# Patient Record
Sex: FEMALE | Race: BLACK OR AFRICAN AMERICAN | NOT HISPANIC OR LATINO
[De-identification: names, ages, dates, MRNs, and addresses within clinical notes are randomized per-mention and may not be internally consistent; named-entity substitution may affect disease eponyms.]

---

## 2017-08-18 ENCOUNTER — RESULT REVIEW (OUTPATIENT)
Age: 72
End: 2017-08-18

## 2019-09-23 ENCOUNTER — EMERGENCY (EMERGENCY)
Facility: HOSPITAL | Age: 74
LOS: 1 days | Discharge: HOME | End: 2019-09-23
Attending: EMERGENCY MEDICINE | Admitting: EMERGENCY MEDICINE
Payer: MEDICARE

## 2019-09-23 VITALS
OXYGEN SATURATION: 94 % | RESPIRATION RATE: 28 BRPM | DIASTOLIC BLOOD PRESSURE: 82 MMHG | SYSTOLIC BLOOD PRESSURE: 130 MMHG | HEART RATE: 90 BPM

## 2019-09-23 DIAGNOSIS — Z90.49 ACQUIRED ABSENCE OF OTHER SPECIFIED PARTS OF DIGESTIVE TRACT: Chronic | ICD-10-CM

## 2019-09-23 DIAGNOSIS — Z98.89 OTHER SPECIFIED POSTPROCEDURAL STATES: Chronic | ICD-10-CM

## 2019-09-23 DIAGNOSIS — Z90.710 ACQUIRED ABSENCE OF BOTH CERVIX AND UTERUS: Chronic | ICD-10-CM

## 2019-09-23 LAB
ALBUMIN SERPL ELPH-MCNC: 3.7 G/DL — SIGNIFICANT CHANGE UP (ref 3.3–5)
ALP SERPL-CCNC: 98 U/L — SIGNIFICANT CHANGE UP (ref 40–120)
ALT FLD-CCNC: 92 U/L — HIGH (ref 4–33)
ANION GAP SERPL CALC-SCNC: 16 MMO/L — HIGH (ref 7–14)
APTT BLD: 28.5 SEC — SIGNIFICANT CHANGE UP (ref 27.5–36.3)
AST SERPL-CCNC: 111 U/L — HIGH (ref 4–32)
BASE EXCESS BLDV CALC-SCNC: 1.8 MMOL/L — SIGNIFICANT CHANGE UP
BASOPHILS # BLD AUTO: 0.14 K/UL — SIGNIFICANT CHANGE UP (ref 0–0.2)
BASOPHILS NFR BLD AUTO: 0.8 % — SIGNIFICANT CHANGE UP (ref 0–2)
BILIRUB SERPL-MCNC: 0.6 MG/DL — SIGNIFICANT CHANGE UP (ref 0.2–1.2)
BLOOD GAS VENOUS - CREATININE: 1.32 MG/DL — HIGH (ref 0.5–1.3)
BUN SERPL-MCNC: 22 MG/DL — SIGNIFICANT CHANGE UP (ref 7–23)
CALCIUM SERPL-MCNC: 8.7 MG/DL — SIGNIFICANT CHANGE UP (ref 8.4–10.5)
CHLORIDE BLDV-SCNC: 101 MMOL/L — SIGNIFICANT CHANGE UP (ref 96–108)
CHLORIDE SERPL-SCNC: 100 MMOL/L — SIGNIFICANT CHANGE UP (ref 98–107)
CO2 SERPL-SCNC: 23 MMOL/L — SIGNIFICANT CHANGE UP (ref 22–31)
CREAT SERPL-MCNC: 1.36 MG/DL — HIGH (ref 0.5–1.3)
EOSINOPHIL # BLD AUTO: 0.4 K/UL — SIGNIFICANT CHANGE UP (ref 0–0.5)
EOSINOPHIL NFR BLD AUTO: 2.3 % — SIGNIFICANT CHANGE UP (ref 0–6)
GAS PNL BLDV: 141 MMOL/L — SIGNIFICANT CHANGE UP (ref 136–146)
GLUCOSE BLDV-MCNC: 201 MG/DL — HIGH (ref 70–99)
GLUCOSE SERPL-MCNC: 203 MG/DL — HIGH (ref 70–99)
HCO3 BLDV-SCNC: 23 MMOL/L — SIGNIFICANT CHANGE UP (ref 20–27)
HCT VFR BLD CALC: 40.7 % — SIGNIFICANT CHANGE UP (ref 34.5–45)
HCT VFR BLDV CALC: 41.1 % — SIGNIFICANT CHANGE UP (ref 34.5–45)
HGB BLD-MCNC: 12.4 G/DL — SIGNIFICANT CHANGE UP (ref 11.5–15.5)
HGB BLDV-MCNC: 13.4 G/DL — SIGNIFICANT CHANGE UP (ref 11.5–15.5)
IMM GRANULOCYTES NFR BLD AUTO: 1.2 % — SIGNIFICANT CHANGE UP (ref 0–1.5)
INR BLD: 1.11 — SIGNIFICANT CHANGE UP (ref 0.88–1.17)
LACTATE BLDV-MCNC: 3.8 MMOL/L — HIGH (ref 0.5–2)
LYMPHOCYTES # BLD AUTO: 21.7 % — SIGNIFICANT CHANGE UP (ref 13–44)
LYMPHOCYTES # BLD AUTO: 3.76 K/UL — HIGH (ref 1–3.3)
MCHC RBC-ENTMCNC: 28.5 PG — SIGNIFICANT CHANGE UP (ref 27–34)
MCHC RBC-ENTMCNC: 30.5 % — LOW (ref 32–36)
MCV RBC AUTO: 93.6 FL — SIGNIFICANT CHANGE UP (ref 80–100)
MONOCYTES # BLD AUTO: 1.03 K/UL — HIGH (ref 0–0.9)
MONOCYTES NFR BLD AUTO: 6 % — SIGNIFICANT CHANGE UP (ref 2–14)
NEUTROPHILS # BLD AUTO: 11.77 K/UL — HIGH (ref 1.8–7.4)
NEUTROPHILS NFR BLD AUTO: 68 % — SIGNIFICANT CHANGE UP (ref 43–77)
NRBC # FLD: 0 K/UL — SIGNIFICANT CHANGE UP (ref 0–0)
NT-PROBNP SERPL-SCNC: 662.2 PG/ML — SIGNIFICANT CHANGE UP
PCO2 BLDV: 67 MMHG — HIGH (ref 41–51)
PH BLDV: 7.25 PH — LOW (ref 7.32–7.43)
PLATELET # BLD AUTO: 228 K/UL — SIGNIFICANT CHANGE UP (ref 150–400)
PMV BLD: 11 FL — SIGNIFICANT CHANGE UP (ref 7–13)
PO2 BLDV: 19 MMHG — LOW (ref 35–40)
POTASSIUM BLDV-SCNC: 4.6 MMOL/L — HIGH (ref 3.4–4.5)
POTASSIUM SERPL-MCNC: 5.3 MMOL/L — SIGNIFICANT CHANGE UP (ref 3.5–5.3)
POTASSIUM SERPL-SCNC: 5.3 MMOL/L — SIGNIFICANT CHANGE UP (ref 3.5–5.3)
PROT SERPL-MCNC: 7.9 G/DL — SIGNIFICANT CHANGE UP (ref 6–8.3)
PROTHROM AB SERPL-ACNC: 12.7 SEC — SIGNIFICANT CHANGE UP (ref 9.8–13.1)
RBC # BLD: 4.35 M/UL — SIGNIFICANT CHANGE UP (ref 3.8–5.2)
RBC # FLD: 14.2 % — SIGNIFICANT CHANGE UP (ref 10.3–14.5)
SAO2 % BLDV: 19.9 % — LOW (ref 60–85)
SODIUM SERPL-SCNC: 139 MMOL/L — SIGNIFICANT CHANGE UP (ref 135–145)
TROPONIN T, HIGH SENSITIVITY: 121 NG/L — CRITICAL HIGH (ref ?–14)
TROPONIN T, HIGH SENSITIVITY: 993 NG/L — CRITICAL HIGH (ref ?–14)
WBC # BLD: 17.31 K/UL — HIGH (ref 3.8–10.5)
WBC # FLD AUTO: 17.31 K/UL — HIGH (ref 3.8–10.5)

## 2019-09-23 PROCEDURE — 99292 CRITICAL CARE ADDL 30 MIN: CPT

## 2019-09-23 PROCEDURE — 71045 X-RAY EXAM CHEST 1 VIEW: CPT | Mod: 26

## 2019-09-23 PROCEDURE — 99291 CRITICAL CARE FIRST HOUR: CPT

## 2019-09-23 RX ORDER — HEPARIN SODIUM 5000 [USP'U]/ML
6500 INJECTION INTRAVENOUS; SUBCUTANEOUS ONCE
Refills: 0 | Status: COMPLETED | OUTPATIENT
Start: 2019-09-23 | End: 2019-09-23

## 2019-09-23 RX ORDER — HEPARIN SODIUM 5000 [USP'U]/ML
6500 INJECTION INTRAVENOUS; SUBCUTANEOUS EVERY 6 HOURS
Refills: 0 | Status: DISCONTINUED | OUTPATIENT
Start: 2019-09-23 | End: 2019-10-04

## 2019-09-23 RX ORDER — HEPARIN SODIUM 5000 [USP'U]/ML
INJECTION INTRAVENOUS; SUBCUTANEOUS
Qty: 25000 | Refills: 0 | Status: DISCONTINUED | OUTPATIENT
Start: 2019-09-23 | End: 2019-10-04

## 2019-09-23 RX ORDER — HEPARIN SODIUM 5000 [USP'U]/ML
3000 INJECTION INTRAVENOUS; SUBCUTANEOUS EVERY 6 HOURS
Refills: 0 | Status: DISCONTINUED | OUTPATIENT
Start: 2019-09-23 | End: 2019-10-04

## 2019-09-23 RX ADMIN — HEPARIN SODIUM 1500 UNIT(S)/HR: 5000 INJECTION INTRAVENOUS; SUBCUTANEOUS at 20:03

## 2019-09-23 RX ADMIN — HEPARIN SODIUM 6500 UNIT(S): 5000 INJECTION INTRAVENOUS; SUBCUTANEOUS at 20:03

## 2019-09-23 NOTE — ED PROVIDER NOTE - CLINICAL SUMMARY MEDICAL DECISION MAKING FREE TEXT BOX
74F presenting in respiratory distress, bedside sono with few b-lines, patient without significant blood pressure, overall picture concerning for PE vs CHF exacerbation vs pna, of these high concern for PE, will start heparin gtt, ct angio pending, labs, patient dispo is admission.

## 2019-09-23 NOTE — ED PROVIDER NOTE - OBJECTIVE STATEMENT
PMHX kidney dz, HTN, DM, Ov CA in remission, vertigo presneting with chest pain x1hr + SOB. Denies any hx of these symptoms in the past, currently on indomethacin, ezetimibe, lisinopril, pioglitazone, metoprolol, triamterene, nifedipine, lovastatin, latanoprost. Given  by EMS en route. Here on CPAP states she feels somewhat better but still having shortness of breath.

## 2019-09-23 NOTE — ED PROVIDER NOTE - ATTENDING CONTRIBUTION TO CARE
74F c/o SOB and chest tightness x  1 hr. initial sat 65%, impv to 76 with NRB.  BP adequate.  EKG no yajaira.  CPAP 88%.  Legs are swollen.  Pt feeling better.  PMHX kidney issues, HTN, DM, Ov CA in remission, vertigo.   NKDA.  Never happened before.  meds- indomethacin, ezetimibe, lisinopril, pioglitazone, metoprolol, triamterene, nifedipine, lovastatin, latanoprost.  pt got  by EMS.  Here pt in mild-mod resp distress, came in on EMS cpap.  Pt noted to have bilat LE edema (+)2, some abd distension, possibly new onset CHF.  We will perform bedside US eval for decr ejection fraction, lung US eval for pulm edema, check labs eval for renal or liver dysfunction, further workup depending on these tests.  Currently sating 92 on CPAP.  No recent travel or hospitalization.  pMD - unk.  EKG - SR at 91 no yajaira no std.  (+)RBBB, new from 2014.   Likely admit MICU vs CCU.  VS:  unremarkable except signfiicant hypoxia    GEN - mod resp distress on CPAP; A+O x3   HEAD - NC/AT     ENT - PEERL, EOMI, mucous membranes  moist , no discharge      NECK: Neck supple, non-tender without lymphadenopathy, no masses, no JVD  PULM - transmitted breath sounds  symmetric breath sounds  COR -  normal heart sounds    ABD - , mild distension NT, soft,  BACK - no CVA tenderness, nontender spine     EXTREMS - (+)2 edema, no deformity, warm and well perfused    SKIN - no rash or bruising      NEUROLOGIC - alert, CN 2-12 intact, sensation nl, motor no focal deficit. 74F c/o SOB and chest tightness x  1 hr. initial sat 65%, impv to 76 with NRB.  BP adequate.  EKG no yajaira.  CPAP 88%.  Legs are swollen.  Pt feeling better.  PMHX kidney issues, HTN, DM, Ov CA in remission, vertigo.   NKDA.  Never happened before.  meds- indomethacin, ezetimibe, lisinopril, pioglitazone, metoprolol, triamterene, nifedipine, lovastatin, latanoprost.  pt got  by EMS.  Here pt in mild-mod resp distress, came in on EMS cpap.  Pt noted to have bilat LE edema (+)2, some abd distension, possibly new onset CHF.  We will perform bedside US eval for decr ejection fraction, lung US eval for pulm edema, check labs eval for renal or liver dysfunction, further workup depending on these tests.  Currently sating 92 on CPAP.  No recent travel or hospitalization.  pMD - unk.  EKG - SR at 91 no yajaira no std.  (+)RBBB, new from 2014.   Likely admit MICU vs CCU.  .  Quick look with US shows reasonable EF, no B-lines.  concern for PE, will start heparin and get CTA  VS:  unremarkable except signfiicant hypoxia    GEN - mod resp distress on CPAP; A+O x3   HEAD - NC/AT     ENT - PEERL, EOMI, mucous membranes  moist , no discharge      NECK: Neck supple, non-tender without lymphadenopathy, no masses, no JVD  PULM - transmitted breath sounds  symmetric breath sounds  COR -  normal heart sounds    ABD - , mild distension NT, soft,  BACK - no CVA tenderness, nontender spine     EXTREMS - (+)2 edema, no deformity, warm and well perfused    SKIN - no rash or bruising      NEUROLOGIC - alert, CN 2-12 intact, sensation nl, motor no focal deficit.

## 2019-09-23 NOTE — ED PROVIDER NOTE - PHYSICAL EXAMINATION
Gen: uncomfortable appearing but able to speak in full sentences on cpap  Eyes: PERRL, EOMI   HENT: Normocephalic, atraumatic. External ears normal, no rhinorrhea, BiPAP machine in place  CV: RRR, no M/R/G  Resp: CTAB aside from mild basilar crackles but difficult to ascertain 2/2 bipap machine  Abd: soft, non tender, non rigid, no guarding or rebound tenderness  Back: No CVAT bilaterally, no midline ttp  Skin: dry, wwp   Neuro: AOx3, speech is fluent and appropriate  Psych: Mood concerned, affect euthymic

## 2019-09-23 NOTE — ED PROVIDER NOTE - PROGRESS NOTE DETAILS
Call from Radiology - CTA with BL large PE in main pulm vessels as well as concern for right heart strain. CCU notified, at bedside, who is calling rest of team for further assessment/intervention. Patient stable on hep drip, BiPap  Mendel Robledo MD, PGY3 Emergency Medicine

## 2019-09-23 NOTE — ED PROVIDER NOTE - PRINCIPAL DIAGNOSIS
Respiratory distress Acute pulmonary embolism with acute cor pulmonale, unspecified pulmonary embolism type

## 2019-09-23 NOTE — ED ADULT NURSE NOTE - OBJECTIVE STATEMENT
Pt received a&ox3, brought in as note to trauma C for CP/SOB, denies any cardiac or pulmonary hx, MD at bedside on arrival, pt sating low to mid 90s on cpap, 20 gauge IV placed in right ac, labs drawn and sent, pt placed on monitor, will continue to monitor.

## 2019-09-23 NOTE — ED PROVIDER NOTE - CARE PLAN
Principal Discharge DX:	Respiratory distress Principal Discharge DX:	Acute pulmonary embolism with acute cor pulmonale, unspecified pulmonary embolism type  Secondary Diagnosis:	Acute respiratory failure, unspecified whether with hypoxia or hypercapnia

## 2019-09-23 NOTE — ED ADULT TRIAGE NOTE - CHIEF COMPLAINT QUOTE
Pt brought in as notification for SOB on CPAP via EMS, brought directly to trauma C with team aware and at bedside.

## 2019-09-24 ENCOUNTER — INPATIENT (INPATIENT)
Facility: HOSPITAL | Age: 74
LOS: 2 days | Discharge: HOME | DRG: 176 | End: 2019-09-27
Attending: INTERNAL MEDICINE | Admitting: INTERNAL MEDICINE
Payer: COMMERCIAL

## 2019-09-24 VITALS — WEIGHT: 181 LBS | HEIGHT: 62 IN | RESPIRATION RATE: 24 BRPM | HEART RATE: 83 BPM | OXYGEN SATURATION: 98 %

## 2019-09-24 VITALS
HEART RATE: 80 BPM | TEMPERATURE: 97 F | OXYGEN SATURATION: 99 % | DIASTOLIC BLOOD PRESSURE: 98 MMHG | RESPIRATION RATE: 20 BRPM | SYSTOLIC BLOOD PRESSURE: 176 MMHG

## 2019-09-24 DIAGNOSIS — K21.9 GASTRO-ESOPHAGEAL REFLUX DISEASE WITHOUT ESOPHAGITIS: ICD-10-CM

## 2019-09-24 DIAGNOSIS — I10 ESSENTIAL (PRIMARY) HYPERTENSION: ICD-10-CM

## 2019-09-24 DIAGNOSIS — I26.02 SADDLE EMBOLUS OF PULMONARY ARTERY WITH ACUTE COR PULMONALE: ICD-10-CM

## 2019-09-24 DIAGNOSIS — E11.9 TYPE 2 DIABETES MELLITUS WITHOUT COMPLICATIONS: ICD-10-CM

## 2019-09-24 DIAGNOSIS — Z98.89 OTHER SPECIFIED POSTPROCEDURAL STATES: Chronic | ICD-10-CM

## 2019-09-24 DIAGNOSIS — Z90.49 ACQUIRED ABSENCE OF OTHER SPECIFIED PARTS OF DIGESTIVE TRACT: Chronic | ICD-10-CM

## 2019-09-24 DIAGNOSIS — Z29.9 ENCOUNTER FOR PROPHYLACTIC MEASURES, UNSPECIFIED: ICD-10-CM

## 2019-09-24 DIAGNOSIS — Z90.710 ACQUIRED ABSENCE OF BOTH CERVIX AND UTERUS: Chronic | ICD-10-CM

## 2019-09-24 DIAGNOSIS — I26.99 OTHER PULMONARY EMBOLISM WITHOUT ACUTE COR PULMONALE: ICD-10-CM

## 2019-09-24 DIAGNOSIS — R09.89 OTHER SPECIFIED SYMPTOMS AND SIGNS INVOLVING THE CIRCULATORY AND RESPIRATORY SYSTEMS: ICD-10-CM

## 2019-09-24 LAB
ANION GAP SERPL CALC-SCNC: 12 MMOL/L — SIGNIFICANT CHANGE UP (ref 5–17)
APTT BLD: 157.8 SEC — CRITICAL HIGH (ref 27.5–36.3)
APTT BLD: 96.9 SEC — HIGH (ref 27.5–36.3)
APTT BLD: > 200 SEC — CRITICAL HIGH (ref 27.5–36.3)
BLD GP AB SCN SERPL QL: NEGATIVE — SIGNIFICANT CHANGE UP
BUN SERPL-MCNC: 18 MG/DL — SIGNIFICANT CHANGE UP (ref 7–23)
CALCIUM SERPL-MCNC: 9.1 MG/DL — SIGNIFICANT CHANGE UP (ref 8.4–10.5)
CHLORIDE SERPL-SCNC: 101 MMOL/L — SIGNIFICANT CHANGE UP (ref 96–108)
CHOLEST SERPL-MCNC: 178 MG/DL — SIGNIFICANT CHANGE UP (ref 10–199)
CO2 SERPL-SCNC: 24 MMOL/L — SIGNIFICANT CHANGE UP (ref 22–31)
CREAT SERPL-MCNC: 0.99 MG/DL — SIGNIFICANT CHANGE UP (ref 0.5–1.3)
GAS PNL BLDA: SIGNIFICANT CHANGE UP
GLUCOSE BLDC GLUCOMTR-MCNC: 129 MG/DL — HIGH (ref 70–99)
GLUCOSE BLDC GLUCOMTR-MCNC: 141 MG/DL — HIGH (ref 70–99)
GLUCOSE BLDC GLUCOMTR-MCNC: 152 MG/DL — HIGH (ref 70–99)
GLUCOSE SERPL-MCNC: 149 MG/DL — HIGH (ref 70–99)
HBA1C BLD-MCNC: 6.9 % — HIGH (ref 4–5.6)
HCT VFR BLD CALC: 40 % — SIGNIFICANT CHANGE UP (ref 34.5–45)
HCT VFR BLD CALC: 40.4 % — SIGNIFICANT CHANGE UP (ref 34.5–45)
HCT VFR BLD CALC: 41.1 % — SIGNIFICANT CHANGE UP (ref 34.5–45)
HDLC SERPL-MCNC: 38 MG/DL — LOW
HGB BLD-MCNC: 12.1 G/DL — SIGNIFICANT CHANGE UP (ref 11.5–15.5)
HGB BLD-MCNC: 12.3 G/DL — SIGNIFICANT CHANGE UP (ref 11.5–15.5)
HGB BLD-MCNC: 13.1 G/DL — SIGNIFICANT CHANGE UP (ref 11.5–15.5)
INR BLD: 1.22 RATIO — HIGH (ref 0.88–1.16)
LACTATE SERPL-SCNC: 1.4 MMOL/L — SIGNIFICANT CHANGE UP (ref 0.7–2)
LIPID PNL WITH DIRECT LDL SERPL: 121 MG/DL — HIGH
MAGNESIUM SERPL-MCNC: 1.7 MG/DL — SIGNIFICANT CHANGE UP (ref 1.6–2.6)
MCHC RBC-ENTMCNC: 28 PG — SIGNIFICANT CHANGE UP (ref 27–34)
MCHC RBC-ENTMCNC: 28.2 PG — SIGNIFICANT CHANGE UP (ref 27–34)
MCHC RBC-ENTMCNC: 29.6 PG — SIGNIFICANT CHANGE UP (ref 27–34)
MCHC RBC-ENTMCNC: 30.3 % — LOW (ref 32–36)
MCHC RBC-ENTMCNC: 30.4 GM/DL — LOW (ref 32–36)
MCHC RBC-ENTMCNC: 32 GM/DL — SIGNIFICANT CHANGE UP (ref 32–36)
MCV RBC AUTO: 92.6 FL — SIGNIFICANT CHANGE UP (ref 80–100)
MCV RBC AUTO: 92.8 FL — SIGNIFICANT CHANGE UP (ref 80–100)
MCV RBC AUTO: 93 FL — SIGNIFICANT CHANGE UP (ref 80–100)
NRBC # FLD: 0 K/UL — SIGNIFICANT CHANGE UP (ref 0–0)
PHOSPHATE SERPL-MCNC: 2.9 MG/DL — SIGNIFICANT CHANGE UP (ref 2.5–4.5)
PLATELET # BLD AUTO: 223 K/UL — SIGNIFICANT CHANGE UP (ref 150–400)
PLATELET # BLD AUTO: 234 K/UL — SIGNIFICANT CHANGE UP (ref 150–400)
PLATELET # BLD AUTO: 235 K/UL — SIGNIFICANT CHANGE UP (ref 150–400)
PMV BLD: 10.9 FL — SIGNIFICANT CHANGE UP (ref 7–13)
POTASSIUM SERPL-MCNC: 4.4 MMOL/L — SIGNIFICANT CHANGE UP (ref 3.5–5.3)
POTASSIUM SERPL-SCNC: 4.4 MMOL/L — SIGNIFICANT CHANGE UP (ref 3.5–5.3)
PROTHROM AB SERPL-ACNC: 14 SEC — HIGH (ref 10–12.9)
RBC # BLD: 4.32 M/UL — SIGNIFICANT CHANGE UP (ref 3.8–5.2)
RBC # BLD: 4.34 M/UL — SIGNIFICANT CHANGE UP (ref 3.8–5.2)
RBC # BLD: 4.43 M/UL — SIGNIFICANT CHANGE UP (ref 3.8–5.2)
RBC # FLD: 12.8 % — SIGNIFICANT CHANGE UP (ref 10.3–14.5)
RBC # FLD: 13.1 % — SIGNIFICANT CHANGE UP (ref 10.3–14.5)
RBC # FLD: 14.2 % — SIGNIFICANT CHANGE UP (ref 10.3–14.5)
RH IG SCN BLD-IMP: POSITIVE — SIGNIFICANT CHANGE UP
SODIUM SERPL-SCNC: 137 MMOL/L — SIGNIFICANT CHANGE UP (ref 135–145)
TOTAL CHOLESTEROL/HDL RATIO MEASUREMENT: 4.7 RATIO — SIGNIFICANT CHANGE UP (ref 3.3–7.1)
TRIGL SERPL-MCNC: 96 MG/DL — SIGNIFICANT CHANGE UP (ref 10–149)
TSH SERPL-MCNC: 1.14 UIU/ML — SIGNIFICANT CHANGE UP (ref 0.27–4.2)
WBC # BLD: 14.19 K/UL — HIGH (ref 3.8–10.5)
WBC # BLD: 17.1 K/UL — HIGH (ref 3.8–10.5)
WBC # BLD: 17.3 K/UL — HIGH (ref 3.8–10.5)
WBC # FLD AUTO: 14.19 K/UL — HIGH (ref 3.8–10.5)
WBC # FLD AUTO: 17.1 K/UL — HIGH (ref 3.8–10.5)
WBC # FLD AUTO: 17.3 K/UL — HIGH (ref 3.8–10.5)

## 2019-09-24 PROCEDURE — 93970 EXTREMITY STUDY: CPT | Mod: 26

## 2019-09-24 PROCEDURE — 93010 ELECTROCARDIOGRAM REPORT: CPT

## 2019-09-24 PROCEDURE — 71275 CT ANGIOGRAPHY CHEST: CPT | Mod: 26

## 2019-09-24 PROCEDURE — 93306 TTE W/DOPPLER COMPLETE: CPT | Mod: 26

## 2019-09-24 PROCEDURE — 99291 CRITICAL CARE FIRST HOUR: CPT

## 2019-09-24 RX ORDER — INSULIN LISPRO 100/ML
VIAL (ML) SUBCUTANEOUS
Refills: 0 | Status: DISCONTINUED | OUTPATIENT
Start: 2019-09-24 | End: 2019-09-27

## 2019-09-24 RX ORDER — MAGNESIUM SULFATE 500 MG/ML
2 VIAL (ML) INJECTION ONCE
Refills: 0 | Status: COMPLETED | OUTPATIENT
Start: 2019-09-24 | End: 2019-09-24

## 2019-09-24 RX ORDER — SODIUM CHLORIDE 9 MG/ML
1000 INJECTION, SOLUTION INTRAVENOUS
Refills: 0 | Status: DISCONTINUED | OUTPATIENT
Start: 2019-09-24 | End: 2019-09-27

## 2019-09-24 RX ORDER — HEPARIN SODIUM 5000 [USP'U]/ML
1200 INJECTION INTRAVENOUS; SUBCUTANEOUS
Qty: 25000 | Refills: 0 | Status: DISCONTINUED | OUTPATIENT
Start: 2019-09-24 | End: 2019-09-26

## 2019-09-24 RX ORDER — ATORVASTATIN CALCIUM 80 MG/1
20 TABLET, FILM COATED ORAL AT BEDTIME
Refills: 0 | Status: DISCONTINUED | OUTPATIENT
Start: 2019-09-24 | End: 2019-09-27

## 2019-09-24 RX ORDER — CHLORHEXIDINE GLUCONATE 213 G/1000ML
1 SOLUTION TOPICAL
Refills: 0 | Status: DISCONTINUED | OUTPATIENT
Start: 2019-09-24 | End: 2019-09-27

## 2019-09-24 RX ORDER — DEXTROSE 50 % IN WATER 50 %
12.5 SYRINGE (ML) INTRAVENOUS ONCE
Refills: 0 | Status: DISCONTINUED | OUTPATIENT
Start: 2019-09-24 | End: 2019-09-27

## 2019-09-24 RX ORDER — PANTOPRAZOLE SODIUM 20 MG/1
40 TABLET, DELAYED RELEASE ORAL
Refills: 0 | Status: DISCONTINUED | OUTPATIENT
Start: 2019-09-24 | End: 2019-09-27

## 2019-09-24 RX ORDER — GLUCAGON INJECTION, SOLUTION 0.5 MG/.1ML
1 INJECTION, SOLUTION SUBCUTANEOUS ONCE
Refills: 0 | Status: DISCONTINUED | OUTPATIENT
Start: 2019-09-24 | End: 2019-09-27

## 2019-09-24 RX ORDER — HEPARIN SODIUM 5000 [USP'U]/ML
3000 INJECTION INTRAVENOUS; SUBCUTANEOUS EVERY 6 HOURS
Refills: 0 | Status: DISCONTINUED | OUTPATIENT
Start: 2019-09-24 | End: 2019-09-26

## 2019-09-24 RX ORDER — INDOMETHACIN 50 MG
50 CAPSULE ORAL
Qty: 0 | Refills: 0 | DISCHARGE

## 2019-09-24 RX ORDER — DEXTROSE 50 % IN WATER 50 %
25 SYRINGE (ML) INTRAVENOUS ONCE
Refills: 0 | Status: DISCONTINUED | OUTPATIENT
Start: 2019-09-24 | End: 2019-09-27

## 2019-09-24 RX ORDER — DEXTROSE 50 % IN WATER 50 %
15 SYRINGE (ML) INTRAVENOUS ONCE
Refills: 0 | Status: DISCONTINUED | OUTPATIENT
Start: 2019-09-24 | End: 2019-09-27

## 2019-09-24 RX ORDER — NICARDIPINE HYDROCHLORIDE 30 MG/1
5 CAPSULE, EXTENDED RELEASE ORAL
Qty: 40 | Refills: 0 | Status: DISCONTINUED | OUTPATIENT
Start: 2019-09-24 | End: 2019-09-24

## 2019-09-24 RX ORDER — INSULIN LISPRO 100/ML
VIAL (ML) SUBCUTANEOUS AT BEDTIME
Refills: 0 | Status: DISCONTINUED | OUTPATIENT
Start: 2019-09-24 | End: 2019-09-27

## 2019-09-24 RX ORDER — HEPARIN SODIUM 5000 [USP'U]/ML
6500 INJECTION INTRAVENOUS; SUBCUTANEOUS EVERY 6 HOURS
Refills: 0 | Status: DISCONTINUED | OUTPATIENT
Start: 2019-09-24 | End: 2019-09-26

## 2019-09-24 RX ORDER — NIFEDIPINE 30 MG
90 TABLET, EXTENDED RELEASE 24 HR ORAL DAILY
Refills: 0 | Status: DISCONTINUED | OUTPATIENT
Start: 2019-09-24 | End: 2019-09-27

## 2019-09-24 RX ORDER — TRIAMTERENE/HYDROCHLOROTHIAZID 75 MG-50MG
1 TABLET ORAL DAILY
Refills: 0 | Status: DISCONTINUED | OUTPATIENT
Start: 2019-09-24 | End: 2019-09-26

## 2019-09-24 RX ORDER — PIOGLITAZONE HYDROCHLORIDE 15 MG/1
1 TABLET ORAL
Qty: 0 | Refills: 0 | DISCHARGE

## 2019-09-24 RX ORDER — INSULIN LISPRO 100/ML
VIAL (ML) SUBCUTANEOUS EVERY 6 HOURS
Refills: 0 | Status: DISCONTINUED | OUTPATIENT
Start: 2019-09-24 | End: 2019-09-24

## 2019-09-24 RX ORDER — LATANOPROST 0.05 MG/ML
1 SOLUTION/ DROPS OPHTHALMIC; TOPICAL
Qty: 0 | Refills: 0 | DISCHARGE

## 2019-09-24 RX ADMIN — NICARDIPINE HYDROCHLORIDE 25 MG/HR: 30 CAPSULE, EXTENDED RELEASE ORAL at 05:40

## 2019-09-24 RX ADMIN — Medication 90 MILLIGRAM(S): at 12:47

## 2019-09-24 RX ADMIN — Medication 1 CAPSULE(S): at 08:46

## 2019-09-24 RX ADMIN — CHLORHEXIDINE GLUCONATE 1 APPLICATION(S): 213 SOLUTION TOPICAL at 22:12

## 2019-09-24 RX ADMIN — Medication 50 GRAM(S): at 07:01

## 2019-09-24 RX ADMIN — HEPARIN SODIUM 900 UNIT(S)/HR: 5000 INJECTION INTRAVENOUS; SUBCUTANEOUS at 17:22

## 2019-09-24 RX ADMIN — ATORVASTATIN CALCIUM 20 MILLIGRAM(S): 80 TABLET, FILM COATED ORAL at 21:39

## 2019-09-24 RX ADMIN — CHLORHEXIDINE GLUCONATE 1 APPLICATION(S): 213 SOLUTION TOPICAL at 06:27

## 2019-09-24 RX ADMIN — HEPARIN SODIUM 0 UNIT(S)/HR: 5000 INJECTION INTRAVENOUS; SUBCUTANEOUS at 07:59

## 2019-09-24 RX ADMIN — HEPARIN SODIUM 1200 UNIT(S)/HR: 5000 INJECTION INTRAVENOUS; SUBCUTANEOUS at 05:06

## 2019-09-24 RX ADMIN — HEPARIN SODIUM 900 UNIT(S)/HR: 5000 INJECTION INTRAVENOUS; SUBCUTANEOUS at 09:55

## 2019-09-24 RX ADMIN — PANTOPRAZOLE SODIUM 40 MILLIGRAM(S): 20 TABLET, DELAYED RELEASE ORAL at 08:46

## 2019-09-24 NOTE — PROGRESS NOTE ADULT - SUBJECTIVE AND OBJECTIVE BOX
====================  CCU MIDNIGHT ROUNDS  ====================    ANGIE JARVIS  [id number]    Patient is a 74y old  Female who presents with a chief complaint of PE (24 Sep 2019 07:56)    ====================  SUMMARY: 75 y/o F with PMH of HTN, HLD, T2DM, ovarian cancer with metastases s/p BSO-BRET and chemo in 2007, and gout who presented to [facility] with acute onset SOB and was transferred to [facility] for acute PE with RV strain.  ====================    ====================  NEW EVENTS: Hemodynamically stable, remains on 2-3L NC Sp02 92-94%  ====================    MEDICATIONS  (STANDING):  atorvastatin 20 milliGRAM(s) Oral at bedtime  chlorhexidine 2% Cloths 1 Application(s) Topical <User Schedule>  dextrose 5%. 1000 milliLiter(s) (50 mL/Hr) IV Continuous <Continuous>  dextrose 50% Injectable 12.5 Gram(s) IV Push once  dextrose 50% Injectable 25 Gram(s) IV Push once  dextrose 50% Injectable 25 Gram(s) IV Push once  heparin  Infusion. 1200 Unit(s)/Hr (12 mL/Hr) IV Continuous <Continuous>  insulin lispro (HumaLOG) corrective regimen sliding scale   SubCutaneous three times a day before meals  insulin lispro (HumaLOG) corrective regimen sliding scale   SubCutaneous at bedtime  NIFEdipine XL 90 milliGRAM(s) Oral daily  pantoprazole    Tablet 40 milliGRAM(s) Oral before breakfast  triamterene 37.5 mG/hydrochlorothiazide 25 mG Capsule 1 Capsule(s) Oral daily    MEDICATIONS  (PRN):  dextrose 40% Gel 15 Gram(s) Oral once PRN Blood Glucose LESS THAN 70 milliGRAM(s)/deciliter  glucagon  Injectable 1 milliGRAM(s) IntraMuscular once PRN Glucose LESS THAN 70 milligrams/deciliter  heparin  Injectable 6500 Unit(s) IV Push every 6 hours PRN For aPTT less than 40  heparin  Injectable 3000 Unit(s) IV Push every 6 hours PRN For aPTT between 40 - 57    ====================  VITALS (Last 12 hrs):  ====================  T(C): 37.1 (09-24-19 @ 19:00), Max: 37.3 (09-24-19 @ 17:30)  T(F): 98.8 (09-24-19 @ 19:00), Max: 99.1 (09-24-19 @ 17:30)  HR: 71 (09-24-19 @ 20:00) (70 - 79)  BP: 128/69 (09-24-19 @ 20:00) (110/70 - 158/100)  BP(mean): 91 (09-24-19 @ 20:00) (83 - 123)  RR: 21 (09-24-19 @ 20:00) (19 - 28)  SpO2: 93% (09-24-19 @ 20:00) (90% - 94%)      I&O's Summary    23 Sep 2019 07:01  -  24 Sep 2019 07:00  --------------------------------------------------------  IN: 74 mL / OUT: 400 mL / NET: -326 mL    24 Sep 2019 07:01  -  24 Sep 2019 21:00  --------------------------------------------------------  IN: 795.5 mL / OUT: 1175 mL / NET: -379.5 mL        ====================  NEW LABS:  ====================  09-24    137  |  101  |  18  ----------------------------<  149<H>  4.4   |  24  |  0.99    Ca    9.1      24 Sep 2019 06:09  Phos  2.9     09-24  Mg     1.7     09-24    TPro  7.9  /  Alb  3.7  /  TBili  0.6  /  DBili  x   /  AST  111<H>  /  ALT  92<H>  /  AlkPhos  98  09-23    PT/INR - ( 24 Sep 2019 06:47 )   PT: 14.0 sec;   INR: 1.22 ratio      PTT - ( 24 Sep 2019 16:09 )  PTT:96.9 sec    ABG - ( 24 Sep 2019 06:06 )  pH, Arterial: 7.45  pH, Blood: x     /  pCO2: 40    /  pO2: 65    / HCO3: 27    / Base Excess: 3.7   /  SaO2: 93        ====================  PLAN:  ====================  #Acute PE  - TTE revealing EF 65-70%, septal hypertrophy, LVH, possible hypertropic CM    +Bates's sign, +RV strain/overload; will need repeat echo outpatient   - S/P VA duplex BLE +DVT L posterior tibial peroneal truck   - Malignancy screening: CT A/P with PO/IV contrast in AM  - Cont. full Heparin gtt per protocol, trend coags/CBC  - No advanced therapies planned at this time  - Supplemental 02 prn, bedrest     #Possible Hypertropic CM  - TTE revealing septal hypertrophy, LVH, possible hypertropic CM  - Consider cardiac MRI to further assess     Sandra Julian CCU NP  Beeper #[id number]  Spectra # [phone]

## 2019-09-24 NOTE — H&P ADULT - ATTENDING COMMENTS
Saddle PE with RV strain.  Follow-up serial TTE and CT.  Trend biomarkers.  Continue heparin gtt.  Supplemental oxygen as needed.    I have personally spent 30 minutes of critical care time excluding time spent on separate procedures.

## 2019-09-24 NOTE — H&P ADULT - NSHPREVIEWOFSYSTEMS_GEN_ALL_CORE
REVIEW OF SYSTEMS:    CONSTITUTIONAL: No weakness, fevers or chills  EYES/ENT: No visual changes;  No vertigo or throat pain   NECK: No pain or stiffness  RESPIRATORY: No cough, wheezing, hemoptysis; + shortness of breath  CARDIOVASCULAR: + chest pain, no palpitations  GASTROINTESTINAL: No abdominal or epigastric pain. No nausea, vomiting, or hematemesis; No diarrhea or constipation. No melena or hematochezia.  GENITOURINARY: No dysuria, frequency or hematuria  NEUROLOGICAL: No numbness or weakness  SKIN: No itching, rashes

## 2019-09-24 NOTE — PATIENT PROFILE ADULT - VISION (WITH CORRECTIVE LENSES IF THE PATIENT USUALLY WEARS THEM):
reading, driving/Partially impaired: cannot see medication labels or newsprint, but can see obstacles in path, and the surrounding layout; can count fingers at arm's length

## 2019-09-24 NOTE — H&P ADULT - NSHPSOCIALHISTORY_GEN_ALL_CORE
Denies ETOH use, no prior history of smoking cigarettes/marijuana, no illegal drug use   Lives with Granddaughter in a private house   Retired RN

## 2019-09-24 NOTE — CONSULT NOTE ADULT - ATTENDING COMMENTS
94% on nasal cannula with stable BP and HR  Heparin gtt  for now.  No advanced therapies    Venous duplex  Echocardiogram  Tomorrow:  if creatinine ok, then CT A/P with IV and PO contrast eval for malignancy      Kalyani [id number]

## 2019-09-24 NOTE — H&P ADULT - NSHPLABSRESULTS_GEN_ALL_CORE
====================  VITALS (Last 12 hrs):  ====================  T(C): 37.1 (09-24-19 @ 05:15), Max: 37.1 (09-24-19 @ 05:15)  T(F): 98.7 (09-24-19 @ 05:15), Max: 98.7 (09-24-19 @ 05:15)  HR: 77 (09-24-19 @ 05:45) (77 - 91)  BP: 204/120 (09-24-19 @ 05:45) (127/90 - 204/120)  BP(mean): 154 (09-24-19 @ 05:45) (148 - 154)  RR: 27 (09-24-19 @ 05:45) (16 - 35)  SpO2: 94% (09-24-19 @ 05:45) (94% - 100%)        I&O's Summary    23 Sep 2019 07:01  -  24 Sep 2019 06:09  --------------------------------------------------------  IN: 25 mL / OUT: 0 mL / NET: 25 mL      ====================  NEW LABS:  ====================  09-23    139  |  100  |  22  ----------------------------<  203<H>  5.3   |  23  |  1.36<H>    Ca    8.7      23 Sep 2019 19:17    TPro  7.9  /  Alb  3.7  /  TBili  0.6  /  DBili  x   /  AST  111<H>  /  ALT  92<H>  /  AlkPhos  98  09-23    PT/INR - ( 23 Sep 2019 19:15 )   PT: 12.7 SEC;   INR: 1.11       PTT - ( 24 Sep 2019 01:47 )  PTT:> 200.0 SEC

## 2019-09-24 NOTE — H&P ADULT - NSICDXPASTMEDICALHX_GEN_ALL_CORE_FT
PAST MEDICAL HISTORY:  Acid reflux     Cataract     Gout     Hyperlipidemia     Hypertension     Ovarian ca

## 2019-09-24 NOTE — CONSULT NOTE ADULT - SUBJECTIVE AND OBJECTIVE BOX
Vascular Cardiology Consult Note     SPECTRA [id number]              EMAIL [email or web address]   OFFICE [phone]    CC: Shortness of breath    HPI:    75 y/o F with PMH of HTN, HLD, T2DM, ovarian cancer s/p BRET who presented to [facility] with acute onset SOB and was transferred to [facility] for acute PE with RV strain. Vascular cardiology consulted for further management.          Allergies    No Known Allergies    Intolerances    	    MEDICATIONS:  heparin  Infusion. 1200 Unit(s)/Hr IV Continuous <Continuous>  heparin  Injectable 6500 Unit(s) IV Push every 6 hours PRN  heparin  Injectable 3000 Unit(s) IV Push every 6 hours PRN  niCARdipine Infusion 5 mG/Hr IV Continuous <Continuous>  NIFEdipine XL 90 milliGRAM(s) Oral daily  triamterene 37.5 mG/hydrochlorothiazide 25 mG Capsule 1 Capsule(s) Oral daily  pantoprazole    Tablet 40 milliGRAM(s) Oral before breakfast  atorvastatin 20 milliGRAM(s) Oral at bedtime  dextrose 40% Gel 15 Gram(s) Oral once PRN  dextrose 50% Injectable 12.5 Gram(s) IV Push once  dextrose 50% Injectable 25 Gram(s) IV Push once  dextrose 50% Injectable 25 Gram(s) IV Push once  glucagon  Injectable 1 milliGRAM(s) IntraMuscular once PRN  insulin lispro (HumaLOG) corrective regimen sliding scale   SubCutaneous every 6 hours  chlorhexidine 2% Cloths 1 Application(s) Topical <User Schedule>  dextrose 5%. 1000 milliLiter(s) IV Continuous <Continuous>      PAST MEDICAL & SURGICAL HISTORY:  Ovarian ca  Cataract  Acid reflux  Hypertension  Hyperlipidemia  Gout  S/P cholecystectomy  S/P hysterectomy  S/P ovarian cystectomy      FAMILY HISTORY:      SOCIAL HISTORY:  unchanged    REVIEW OF SYSTEMS:  CONSTITUTIONAL: No fever, weight loss, or fatigue  EYES: No eye pain, visual disturbances, or discharge  ENMT:  No difficulty hearing, tinnitus, vertigo; No sinus or throat pain  NECK: No pain or stiffness  RESPIRATORY:    CARDIOVASCULAR:    GASTROINTESTINAL: No abdominal or epigastric pain. No nausea, vomiting, or hematemesis; No diarrhea or constipation. No melena or hematochezia.  GENITOURINARY: No dysuria, frequency, hematuria, or incontinence  NEUROLOGICAL: No headaches, memory loss, loss of strength, numbness, or tremors  SKIN:   LYMPH Nodes: No enlarged glands  ENDOCRINE: No heat or cold intolerance; No hair loss  MUSCULOSKELETAL: No joint pain or swelling; No muscle, back, or extremity pain  PSYCHIATRIC: No depression, anxiety, mood swings, or difficulty sleeping  HEME/LYMPH: No easy bruising, or bleeding gums  ALLERY AND IMMUNOLOGIC: No hives or eczema	    [ x] All others negative	  [ ] Unable to obtain    PHYSICAL EXAM:  T(C): 37.1 (09-24-19 @ 05:15), Max: 37.1 (09-24-19 @ 05:15)  HR: 75 (09-24-19 @ 07:30) (75 - 91)  BP: 133/75 (09-24-19 @ 07:30) (127/90 - 204/120)  RR: 21 (09-24-19 @ 07:30) (16 - 35)  SpO2: 94% (09-24-19 @ 07:30) (92% - 100%)  Wt(kg): --  I&O's Summary    23 Sep 2019 07:01  -  24 Sep 2019 07:00  --------------------------------------------------------  IN: 74 mL / OUT: 400 mL / NET: -326 mL    24 Sep 2019 07:01  -  24 Sep 2019 07:57  --------------------------------------------------------  IN: 50 mL / OUT: 0 mL / NET: 50 mL        Appearance:  	  HEENT:   Normal oral mucosa, PERRL, EOMI	  Carotid:   Right:    Left:    Lymphatic: No lymphadenopathy  Cardiovascular:    Respiratory:  	  Psychiatry:  AAO x   Gastrointestinal:  Soft, Non-tender, + BS	  Skin: No rashes, No ecchymoses, No cyanosis	  Neurologic:    Extremities:      Vascular Pulse Exam:  Right DP: []palpable []non-palpable []audible      Left DP :   []palpable []non-palpable []audible  Right PT: []palpable [] non-palpable []audible   Left PT:  [] palpable [] non-palpable []audible         Foot Exam:        LABS:	 	    CBC Full  -  ( 24 Sep 2019 06:09 )  WBC Count : 17.3 K/uL  Hemoglobin : 12.3 g/dL  Hematocrit : 40.4 %  Platelet Count - Automated : 234 K/uL  Mean Cell Volume : 93.0 fl  Mean Cell Hemoglobin : 28.2 pg  Mean Cell Hemoglobin Concentration : 30.4 gm/dL  Auto Neutrophil # : x  Auto Lymphocyte # : x  Auto Monocyte # : x  Auto Eosinophil # : x  Auto Basophil # : x  Auto Neutrophil % : x  Auto Lymphocyte % : x  Auto Monocyte % : x  Auto Eosinophil % : x  Auto Basophil % : x    09-24    137  |  101  |  18  ----------------------------<  149<H>  4.4   |  24  |  0.99  09-23    139  |  100  |  22  ----------------------------<  203<H>  5.3   |  23  |  1.36<H>    Ca    9.1      24 Sep 2019 06:09  Ca    8.7      23 Sep 2019 19:17  Phos  2.9     09-24  Mg     1.7     09-24    TPro  7.9  /  Alb  3.7  /  TBili  0.6  /  DBili  x   /  AST  111<H>  /  ALT  92<H>  /  AlkPhos  98  09-23    CTA Chest 9/24/19  < from: CT Angio Chest w/ IV Cont (09.24.19 @ 00:26) >  IMPRESSION:     Pulmonary emboli in the bilateral main pulmonary arteries extending into   all lobar branches as well as several segmental and subsegmental   pulmonary arteries. Mild increase in the right ventricular to left   ventricular ratio concerning for right heart strain. Echocardiogram can   be performed for further evaluation as clinically indicated    < end of copied text >        Assessment:  1. Acute bilateral PE  2. STORMY  3. History of ovarian cancer      Plan:  1. Continue heparin gtt  2. Check TTE to assess RV function  3. Recommend bilateral LE duplex US     ***In progress    Thank you  ALAN Treadwell MD  Cardiology Fellow  Vascular Cardiology Service    Please call with any questions:   SPECTRA - [phone]  Office [phone]  email:  [email or web address] Vascular Cardiology Consult Note     SPECTRA [id number]              EMAIL [email or web address]   OFFICE [phone]    CC: Shortness of breath    HPI:    73 y/o F with PMH of HTN, HLD, T2DM, ovarian cancer s/p BRET who presented to [facility] with acute onset SOB and was transferred to [facility] for acute PE with RV strain. Vascular cardiology consulted for further management.          Allergies    No Known Allergies    Intolerances    	    MEDICATIONS:  heparin  Infusion. 1200 Unit(s)/Hr IV Continuous <Continuous>  heparin  Injectable 6500 Unit(s) IV Push every 6 hours PRN  heparin  Injectable 3000 Unit(s) IV Push every 6 hours PRN  niCARdipine Infusion 5 mG/Hr IV Continuous <Continuous>  NIFEdipine XL 90 milliGRAM(s) Oral daily  triamterene 37.5 mG/hydrochlorothiazide 25 mG Capsule 1 Capsule(s) Oral daily  pantoprazole    Tablet 40 milliGRAM(s) Oral before breakfast  atorvastatin 20 milliGRAM(s) Oral at bedtime  dextrose 40% Gel 15 Gram(s) Oral once PRN  dextrose 50% Injectable 12.5 Gram(s) IV Push once  dextrose 50% Injectable 25 Gram(s) IV Push once  dextrose 50% Injectable 25 Gram(s) IV Push once  glucagon  Injectable 1 milliGRAM(s) IntraMuscular once PRN  insulin lispro (HumaLOG) corrective regimen sliding scale   SubCutaneous every 6 hours  chlorhexidine 2% Cloths 1 Application(s) Topical <User Schedule>  dextrose 5%. 1000 milliLiter(s) IV Continuous <Continuous>      PAST MEDICAL & SURGICAL HISTORY:  Ovarian ca  Cataract  Acid reflux  Hypertension  Hyperlipidemia  Gout  S/P cholecystectomy  S/P hysterectomy  S/P ovarian cystectomy      FAMILY HISTORY:      SOCIAL HISTORY:  unchanged    REVIEW OF SYSTEMS:  CONSTITUTIONAL: No fever, weight loss, or fatigue  EYES: No eye pain, visual disturbances, or discharge  ENMT:  No difficulty hearing, tinnitus, vertigo; No sinus or throat pain  NECK: No pain or stiffness  RESPIRATORY:    CARDIOVASCULAR:    GASTROINTESTINAL: No abdominal or epigastric pain. No nausea, vomiting, or hematemesis; No diarrhea or constipation. No melena or hematochezia.  GENITOURINARY: No dysuria, frequency, hematuria, or incontinence  NEUROLOGICAL: No headaches, memory loss, loss of strength, numbness, or tremors  SKIN:   LYMPH Nodes: No enlarged glands  ENDOCRINE: No heat or cold intolerance; No hair loss  MUSCULOSKELETAL: No joint pain or swelling; No muscle, back, or extremity pain  PSYCHIATRIC: No depression, anxiety, mood swings, or difficulty sleeping  HEME/LYMPH: No easy bruising, or bleeding gums  ALLERY AND IMMUNOLOGIC: No hives or eczema	    [ x] All others negative	  [ ] Unable to obtain    PHYSICAL EXAM:  T(C): 37.1 (09-24-19 @ 05:15), Max: 37.1 (09-24-19 @ 05:15)  HR: 75 (09-24-19 @ 07:30) (75 - 91)  BP: 133/75 (09-24-19 @ 07:30) (127/90 - 204/120)  RR: 21 (09-24-19 @ 07:30) (16 - 35)  SpO2: 94% (09-24-19 @ 07:30) (92% - 100%)  Wt(kg): --  I&O's Summary    23 Sep 2019 07:01  -  24 Sep 2019 07:00  --------------------------------------------------------  IN: 74 mL / OUT: 400 mL / NET: -326 mL    24 Sep 2019 07:01  -  24 Sep 2019 07:57  --------------------------------------------------------  IN: 50 mL / OUT: 0 mL / NET: 50 mL        Appearance:  	  HEENT:   Normal oral mucosa, PERRL, EOMI	  Carotid:   Right:    Left:    Lymphatic: No lymphadenopathy  Cardiovascular:    Respiratory:  	  Psychiatry:  AAO x   Gastrointestinal:  Soft, Non-tender, + BS	  Skin: No rashes, No ecchymoses, No cyanosis	  Neurologic:    Extremities:      Vascular Pulse Exam:  Right DP: []palpable []non-palpable []audible      Left DP :   []palpable []non-palpable []audible  Right PT: []palpable [] non-palpable []audible   Left PT:  [] palpable [] non-palpable []audible         Foot Exam:        LABS:	 	    CBC Full  -  ( 24 Sep 2019 06:09 )  WBC Count : 17.3 K/uL  Hemoglobin : 12.3 g/dL  Hematocrit : 40.4 %  Platelet Count - Automated : 234 K/uL  Mean Cell Volume : 93.0 fl  Mean Cell Hemoglobin : 28.2 pg  Mean Cell Hemoglobin Concentration : 30.4 gm/dL  Auto Neutrophil # : x  Auto Lymphocyte # : x  Auto Monocyte # : x  Auto Eosinophil # : x  Auto Basophil # : x  Auto Neutrophil % : x  Auto Lymphocyte % : x  Auto Monocyte % : x  Auto Eosinophil % : x  Auto Basophil % : x    09-24    137  |  101  |  18  ----------------------------<  149<H>  4.4   |  24  |  0.99  09-23    139  |  100  |  22  ----------------------------<  203<H>  5.3   |  23  |  1.36<H>    Ca    9.1      24 Sep 2019 06:09  Ca    8.7      23 Sep 2019 19:17  Phos  2.9     09-24  Mg     1.7     09-24    TPro  7.9  /  Alb  3.7  /  TBili  0.6  /  DBili  x   /  AST  111<H>  /  ALT  92<H>  /  AlkPhos  98  09-23    CTA Chest 9/24/19  < from: CT Angio Chest w/ IV Cont (09.24.19 @ 00:26) >  IMPRESSION:     Pulmonary emboli in the bilateral main pulmonary arteries extending into   all lobar branches as well as several segmental and subsegmental   pulmonary arteries. Mild increase in the right ventricular to left   ventricular ratio concerning for right heart strain. Echocardiogram can   be performed for further evaluation as clinically indicated    < end of copied text >        Assessment:  1. Acute bilateral PE  2. STORMY  3. History of ovarian cancer  4. Hypertensive urgency      Plan:  1. Continue heparin gtt  2. Check TTE to assess RV function  3. Recommend bilateral LE duplex US     ***In progress    Thank you  ALAN Treadwell MD  Cardiology Fellow  Vascular Cardiology Service    Please call with any questions:   SPECTRA - [phone]  Office [phone]  email:  [email or web address] Vascular Cardiology Consult Note     SPECTRA [id number]              EMAIL [email or web address]   OFFICE [phone]    CC: Shortness of breath    HPI:    75 y/o F with PMH of HTN, HLD, T2DM, ovarian cancer s/p BSO-BRET and chemo in 2007, and gout who presented to [facility] with acute onset SOB and was transferred to [facility] for acute PE with RV strain. Vascular cardiology consulted for further management.     The patient developed acute onset shortness of breath on the morning of presentation, 9/23. Never occurred before. Patient notes R leg swelling x2-3 weeks and recent gout flare that left her bedbound. She has no personal or family history of PE/DVT. Non-smoker. No recent travel.     Per chart, patient hypoxic to SpO2 65% for EMS, improved to SpO2 88% with BiPAP. SBP 130s. CTA with pulmonary emboli in the bilateral main PAs extending into all lobar branches, as well as several segmental and subsegmental PAs. Mild increase in RV to LV ration, concerning for right heart strain. She was transferred to [facility] for further evaluation by PERT team. Upon arrival to [facility], she was hypertensive (/120) and tachypneic with SpO2 to low 90s on 4L NC. She was started on a Cardene gtt and continued on BiPAP.     Allergies    No Known Allergies    Intolerances    	    MEDICATIONS:  heparin  Infusion. 1200 Unit(s)/Hr IV Continuous <Continuous>  heparin  Injectable 6500 Unit(s) IV Push every 6 hours PRN  heparin  Injectable 3000 Unit(s) IV Push every 6 hours PRN  niCARdipine Infusion 5 mG/Hr IV Continuous <Continuous>  NIFEdipine XL 90 milliGRAM(s) Oral daily  triamterene 37.5 mG/hydrochlorothiazide 25 mG Capsule 1 Capsule(s) Oral daily  pantoprazole    Tablet 40 milliGRAM(s) Oral before breakfast  atorvastatin 20 milliGRAM(s) Oral at bedtime  dextrose 40% Gel 15 Gram(s) Oral once PRN  dextrose 50% Injectable 12.5 Gram(s) IV Push once  dextrose 50% Injectable 25 Gram(s) IV Push once  dextrose 50% Injectable 25 Gram(s) IV Push once  glucagon  Injectable 1 milliGRAM(s) IntraMuscular once PRN  insulin lispro (HumaLOG) corrective regimen sliding scale   SubCutaneous every 6 hours  chlorhexidine 2% Cloths 1 Application(s) Topical <User Schedule>  dextrose 5%. 1000 milliLiter(s) IV Continuous <Continuous>      PAST MEDICAL & SURGICAL HISTORY:  Ovarian ca  Cataract  Acid reflux  Hypertension  Hyperlipidemia  Gout  S/P cholecystectomy  S/P hysterectomy  S/P ovarian cystectomy      FAMILY HISTORY:      SOCIAL HISTORY:  unchanged    REVIEW OF SYSTEMS:  CONSTITUTIONAL: No fever, weight loss, or fatigue  EYES: No eye pain, visual disturbances, or discharge  ENMT:  No difficulty hearing, tinnitus, vertigo; No sinus or throat pain  NECK: No pain or stiffness  RESPIRATORY:    CARDIOVASCULAR:    GASTROINTESTINAL: No abdominal or epigastric pain. No nausea, vomiting, or hematemesis; No diarrhea or constipation. No melena or hematochezia.  GENITOURINARY: No dysuria, frequency, hematuria, or incontinence  NEUROLOGICAL: No headaches, memory loss, loss of strength, numbness, or tremors  SKIN:   LYMPH Nodes: No enlarged glands  ENDOCRINE: No heat or cold intolerance; No hair loss  MUSCULOSKELETAL: No joint pain or swelling; No muscle, back, or extremity pain  PSYCHIATRIC: No depression, anxiety, mood swings, or difficulty sleeping  HEME/LYMPH: No easy bruising, or bleeding gums  ALLERY AND IMMUNOLOGIC: No hives or eczema	    [ x] All others negative	  [ ] Unable to obtain    PHYSICAL EXAM:  T(C): 37.1 (09-24-19 @ 05:15), Max: 37.1 (09-24-19 @ 05:15)  HR: 75 (09-24-19 @ 07:30) (75 - 91)  BP: 133/75 (09-24-19 @ 07:30) (127/90 - 204/120)  RR: 21 (09-24-19 @ 07:30) (16 - 35)  SpO2: 94% (09-24-19 @ 07:30) (92% - 100%)  Wt(kg): --  I&O's Summary    23 Sep 2019 07:01  -  24 Sep 2019 07:00  --------------------------------------------------------  IN: 74 mL / OUT: 400 mL / NET: -326 mL    24 Sep 2019 07:01  -  24 Sep 2019 07:57  --------------------------------------------------------  IN: 50 mL / OUT: 0 mL / NET: 50 mL        Appearance:  	  HEENT:   Normal oral mucosa, PERRL, EOMI	  Carotid:   Right:    Left:    Lymphatic: No lymphadenopathy  Cardiovascular:    Respiratory:  	  Psychiatry:  AAO x   Gastrointestinal:  Soft, Non-tender, + BS	  Skin: No rashes, No ecchymoses, No cyanosis	  Neurologic:    Extremities:      Vascular Pulse Exam:  Right DP: []palpable []non-palpable []audible      Left DP :   []palpable []non-palpable []audible  Right PT: []palpable [] non-palpable []audible   Left PT:  [] palpable [] non-palpable []audible         Foot Exam:        LABS:	 	    CBC Full  -  ( 24 Sep 2019 06:09 )  WBC Count : 17.3 K/uL  Hemoglobin : 12.3 g/dL  Hematocrit : 40.4 %  Platelet Count - Automated : 234 K/uL  Mean Cell Volume : 93.0 fl  Mean Cell Hemoglobin : 28.2 pg  Mean Cell Hemoglobin Concentration : 30.4 gm/dL  Auto Neutrophil # : x  Auto Lymphocyte # : x  Auto Monocyte # : x  Auto Eosinophil # : x  Auto Basophil # : x  Auto Neutrophil % : x  Auto Lymphocyte % : x  Auto Monocyte % : x  Auto Eosinophil % : x  Auto Basophil % : x    09-24    137  |  101  |  18  ----------------------------<  149<H>  4.4   |  24  |  0.99  09-23    139  |  100  |  22  ----------------------------<  203<H>  5.3   |  23  |  1.36<H>    Ca    9.1      24 Sep 2019 06:09  Ca    8.7      23 Sep 2019 19:17  Phos  2.9     09-24  Mg     1.7     09-24    TPro  7.9  /  Alb  3.7  /  TBili  0.6  /  DBili  x   /  AST  111<H>  /  ALT  92<H>  /  AlkPhos  98  09-23    HS trop 121 -> 993  ProBNP 662    CTA Chest 9/24/19  < from: CT Angio Chest w/ IV Cont (09.24.19 @ 00:26) >  IMPRESSION:     Pulmonary emboli in the bilateral main pulmonary arteries extending into   all lobar branches as well as several segmental and subsegmental   pulmonary arteries. Mild increase in the right ventricular to left   ventricular ratio concerning for right heart strain. Echocardiogram can   be performed for further evaluation as clinically indicated    < end of copied text >        Assessment:  75 y/o F with PMH of HTN, HLD, T2DM, ovarian cancer s/p BSO-BRET and chemo in 2007, and gout who presented to [facility] with acute onset SOB and was transferred to [facility] for acute PE with RV strain. Vascular cardiology consulted for further management.     1. Acute bilateral PE  - Submassive with elevated troponin and proBNP  - Hemodynamically stable  2. STORMY  3. History of ovarian cancer  4. Hypertensive urgency      Plan:  1. Continue heparin gtt, no advanced therapies planned at this time  2. Check TTE to assess RV function  3. Recommend bilateral LE duplex US       Thank you,    ALAN Treadwell MD  Cardiology Fellow  Vascular Cardiology Service    Please call with any questions:   SPECTRA - [phone]  Office [phone]  email:  [email or web address] Vascular Cardiology Consult Note     SPECTRA [id number]              EMAIL [email or web address]   OFFICE [phone]    CC: Shortness of breath    HPI:    75 y/o F with PMH of HTN, HLD, T2DM, ovarian cancer s/p BSO-BRET and chemo in 2007, and gout who presented to [facility] with acute onset SOB and was transferred to [facility] for acute PE with RV strain. Vascular cardiology consulted for further management.     The patient developed acute onset shortness of breath on the morning of presentation, 9/23. Never occurred before. Patient notes R leg swelling x2-3 weeks and recent gout flare that left her bedbound. She has no personal or family history of PE/DVT. Non-smoker. No recent travel.     Per chart, patient hypoxic to SpO2 65% for EMS, improved to SpO2 88% with BiPAP. SBP 130s. CTA with pulmonary emboli in the bilateral main PAs extending into all lobar branches, as well as several segmental and subsegmental PAs. Mild increase in RV to LV ration, concerning for right heart strain. She was transferred to [facility] for further evaluation by PERT team. Upon arrival to [facility], she was hypertensive (/120) and tachypneic with SpO2 to low 90s on 4L NC. She was started on a Cardene gtt and continued on BiPAP.     Allergies    No Known Allergies    Intolerances    	    MEDICATIONS:  heparin  Infusion. 1200 Unit(s)/Hr IV Continuous <Continuous>  heparin  Injectable 6500 Unit(s) IV Push every 6 hours PRN  heparin  Injectable 3000 Unit(s) IV Push every 6 hours PRN  niCARdipine Infusion 5 mG/Hr IV Continuous <Continuous>  NIFEdipine XL 90 milliGRAM(s) Oral daily  triamterene 37.5 mG/hydrochlorothiazide 25 mG Capsule 1 Capsule(s) Oral daily  pantoprazole    Tablet 40 milliGRAM(s) Oral before breakfast  atorvastatin 20 milliGRAM(s) Oral at bedtime  dextrose 40% Gel 15 Gram(s) Oral once PRN  dextrose 50% Injectable 12.5 Gram(s) IV Push once  dextrose 50% Injectable 25 Gram(s) IV Push once  dextrose 50% Injectable 25 Gram(s) IV Push once  glucagon  Injectable 1 milliGRAM(s) IntraMuscular once PRN  insulin lispro (HumaLOG) corrective regimen sliding scale   SubCutaneous every 6 hours  chlorhexidine 2% Cloths 1 Application(s) Topical <User Schedule>  dextrose 5%. 1000 milliLiter(s) IV Continuous <Continuous>      PAST MEDICAL & SURGICAL HISTORY:  Ovarian ca  Cataract  Acid reflux  Hypertension  Hyperlipidemia  Gout  S/P cholecystectomy  S/P hysterectomy  S/P ovarian cystectomy      FAMILY HISTORY:      SOCIAL HISTORY:  unchanged    REVIEW OF SYSTEMS:  CONSTITUTIONAL: No fever, weight loss, or fatigue  EYES: No eye pain, visual disturbances, or discharge  ENMT:  No difficulty hearing, tinnitus, vertigo; No sinus or throat pain  NECK: No pain or stiffness  RESPIRATORY:    CARDIOVASCULAR:    GASTROINTESTINAL: No abdominal or epigastric pain. No nausea, vomiting, or hematemesis; No diarrhea or constipation. No melena or hematochezia.  GENITOURINARY: No dysuria, frequency, hematuria, or incontinence  NEUROLOGICAL: No headaches, memory loss, loss of strength, numbness, or tremors  SKIN:   LYMPH Nodes: No enlarged glands  ENDOCRINE: No heat or cold intolerance; No hair loss  MUSCULOSKELETAL: No joint pain or swelling; No muscle, back, or extremity pain  PSYCHIATRIC: No depression, anxiety, mood swings, or difficulty sleeping  HEME/LYMPH: No easy bruising, or bleeding gums  ALLERY AND IMMUNOLOGIC: No hives or eczema	    [ x] All others negative	  [ ] Unable to obtain    PHYSICAL EXAM:  T(C): 37.1 (09-24-19 @ 05:15), Max: 37.1 (09-24-19 @ 05:15)  HR: 75 (09-24-19 @ 07:30) (75 - 91)  BP: 133/75 (09-24-19 @ 07:30) (127/90 - 204/120)  RR: 21 (09-24-19 @ 07:30) (16 - 35)  SpO2: 94% (09-24-19 @ 07:30) (92% - 100%)  Wt(kg): --  I&O's Summary    23 Sep 2019 07:01  -  24 Sep 2019 07:00  --------------------------------------------------------  IN: 74 mL / OUT: 400 mL / NET: -326 mL    24 Sep 2019 07:01  -  24 Sep 2019 07:57  --------------------------------------------------------  IN: 50 mL / OUT: 0 mL / NET: 50 mL        Appearance:  	  HEENT:   Normal oral mucosa, PERRL, EOMI	  Carotid:   Right:    Left:    Lymphatic: No lymphadenopathy  Cardiovascular:    Respiratory:  	  Psychiatry:  AAO x   Gastrointestinal:  Soft, Non-tender, + BS	  Skin: No rashes, No ecchymoses, No cyanosis	  Neurologic:    Extremities:      Vascular Pulse Exam:  Right DP: []palpable []non-palpable []audible      Left DP :   []palpable []non-palpable []audible  Right PT: []palpable [] non-palpable []audible   Left PT:  [] palpable [] non-palpable []audible         Foot Exam:        LABS:	 	    CBC Full  -  ( 24 Sep 2019 06:09 )  WBC Count : 17.3 K/uL  Hemoglobin : 12.3 g/dL  Hematocrit : 40.4 %  Platelet Count - Automated : 234 K/uL  Mean Cell Volume : 93.0 fl  Mean Cell Hemoglobin : 28.2 pg  Mean Cell Hemoglobin Concentration : 30.4 gm/dL  Auto Neutrophil # : x  Auto Lymphocyte # : x  Auto Monocyte # : x  Auto Eosinophil # : x  Auto Basophil # : x  Auto Neutrophil % : x  Auto Lymphocyte % : x  Auto Monocyte % : x  Auto Eosinophil % : x  Auto Basophil % : x    09-24    137  |  101  |  18  ----------------------------<  149<H>  4.4   |  24  |  0.99  09-23    139  |  100  |  22  ----------------------------<  203<H>  5.3   |  23  |  1.36<H>    Ca    9.1      24 Sep 2019 06:09  Ca    8.7      23 Sep 2019 19:17  Phos  2.9     09-24  Mg     1.7     09-24    TPro  7.9  /  Alb  3.7  /  TBili  0.6  /  DBili  x   /  AST  111<H>  /  ALT  92<H>  /  AlkPhos  98  09-23    HS trop 121 -> 993  ProBNP 662    CTA Chest 9/24/19  < from: CT Angio Chest w/ IV Cont (09.24.19 @ 00:26) >  IMPRESSION:     Pulmonary emboli in the bilateral main pulmonary arteries extending into   all lobar branches as well as several segmental and subsegmental   pulmonary arteries. Mild increase in the right ventricular to left   ventricular ratio concerning for right heart strain. Echocardiogram can   be performed for further evaluation as clinically indicated    < end of copied text >        Assessment:  75 y/o F with PMH of HTN, HLD, T2DM, ovarian cancer s/p BSO-BRET and chemo in 2007, and gout who presented to [facility] with acute onset SOB and was transferred to [facility] for acute PE with RV strain. Vascular cardiology consulted for further management.     1. Acute bilateral PE - history of ovarian cancer, also recently bed bound for gout flare   - Submassive with elevated HS troponin and proBNP  - Hemodynamically stable  2. STORMY  3. History of ovarian cancer  4. Hypertensive urgency      Plan:  1. Continue heparin gtt, no advanced therapies planned at this time  2. Check TTE to assess RV function  3. Recommend bilateral LE duplex US  4. Consider CT A/P with PO and IV contrast tomorrow if Cr stable given her history of ovarian cancer       Thank you,    ALAN Treadwell MD  Cardiology Fellow  Vascular Cardiology Service    Please call with any questions:   SPECTRA - [phone]  Office [phone]  email:  [email or web address] Vascular Cardiology Consult Note     SPECTRA [id number]              EMAIL [email or web address]   OFFICE [phone]    CC: Shortness of breath    HPI:    75 y/o F with PMH of HTN, HLD, T2DM, ovarian cancer with metastases s/p BSO-BRET and chemo in 2007, and gout who presented to [facility] with acute onset SOB and was transferred to [facility] for acute PE with RV strain. Vascular cardiology consulted for further management.     The patient developed acute onset shortness of breath on the morning of presentation, 9/23. Never occurred before. Patient notes R leg swelling x2-3 weeks and recent gout flare that left her bedbound. She has no personal or family history of PE/DVT. Non-smoker. No recent travel.     Per chart, patient hypoxic to SpO2 65% for EMS, improved to SpO2 88% with BiPAP. SBP 130s. CTA with pulmonary emboli in the bilateral main PAs extending into all lobar branches, as well as several segmental and subsegmental PAs. Mild increase in RV to LV ration, concerning for right heart strain. She was transferred to [facility] for further evaluation by PERT team. Upon arrival to [facility], she was hypertensive (/120) and tachypneic with SpO2 to low 90s on 4L NC. She was started on a Cardene gtt and continued on BiPAP.     Allergies    No Known Allergies    Intolerances    	    MEDICATIONS:  heparin  Infusion. 1200 Unit(s)/Hr IV Continuous <Continuous>  heparin  Injectable 6500 Unit(s) IV Push every 6 hours PRN  heparin  Injectable 3000 Unit(s) IV Push every 6 hours PRN  niCARdipine Infusion 5 mG/Hr IV Continuous <Continuous>  NIFEdipine XL 90 milliGRAM(s) Oral daily  triamterene 37.5 mG/hydrochlorothiazide 25 mG Capsule 1 Capsule(s) Oral daily  pantoprazole    Tablet 40 milliGRAM(s) Oral before breakfast  atorvastatin 20 milliGRAM(s) Oral at bedtime  dextrose 40% Gel 15 Gram(s) Oral once PRN  dextrose 50% Injectable 12.5 Gram(s) IV Push once  dextrose 50% Injectable 25 Gram(s) IV Push once  dextrose 50% Injectable 25 Gram(s) IV Push once  glucagon  Injectable 1 milliGRAM(s) IntraMuscular once PRN  insulin lispro (HumaLOG) corrective regimen sliding scale   SubCutaneous every 6 hours  chlorhexidine 2% Cloths 1 Application(s) Topical <User Schedule>  dextrose 5%. 1000 milliLiter(s) IV Continuous <Continuous>      PAST MEDICAL & SURGICAL HISTORY:  Ovarian ca  Cataract  Acid reflux  Hypertension  Hyperlipidemia  Gout  S/P cholecystectomy  S/P hysterectomy  S/P ovarian cystectomy      FAMILY HISTORY:      SOCIAL HISTORY:  unchanged    REVIEW OF SYSTEMS:  CONSTITUTIONAL: No fever, weight loss, or fatigue  EYES: No eye pain, visual disturbances, or discharge  ENMT:  No difficulty hearing, tinnitus, vertigo; No sinus or throat pain  NECK: No pain or stiffness  RESPIRATORY:    CARDIOVASCULAR:    GASTROINTESTINAL: No abdominal or epigastric pain. No nausea, vomiting, or hematemesis; No diarrhea or constipation. No melena or hematochezia.  GENITOURINARY: No dysuria, frequency, hematuria, or incontinence  NEUROLOGICAL: No headaches, memory loss, loss of strength, numbness, or tremors  SKIN:   LYMPH Nodes: No enlarged glands  ENDOCRINE: No heat or cold intolerance; No hair loss  MUSCULOSKELETAL: No joint pain or swelling; No muscle, back, or extremity pain  PSYCHIATRIC: No depression, anxiety, mood swings, or difficulty sleeping  HEME/LYMPH: No easy bruising, or bleeding gums  ALLERY AND IMMUNOLOGIC: No hives or eczema	    [ x] All others negative	  [ ] Unable to obtain    PHYSICAL EXAM:  T(C): 37.1 (09-24-19 @ 05:15), Max: 37.1 (09-24-19 @ 05:15)  HR: 75 (09-24-19 @ 07:30) (75 - 91)  BP: 133/75 (09-24-19 @ 07:30) (127/90 - 204/120)  RR: 21 (09-24-19 @ 07:30) (16 - 35)  SpO2: 94% (09-24-19 @ 07:30) (92% - 100%)  Wt(kg): --  I&O's Summary    23 Sep 2019 07:01  -  24 Sep 2019 07:00  --------------------------------------------------------  IN: 74 mL / OUT: 400 mL / NET: -326 mL    24 Sep 2019 07:01  -  24 Sep 2019 07:57  --------------------------------------------------------  IN: 50 mL / OUT: 0 mL / NET: 50 mL        Appearance:  	  HEENT:   Normal oral mucosa, PERRL, EOMI	  Carotid:   Right:    Left:    Lymphatic: No lymphadenopathy  Cardiovascular:    Respiratory:  	  Psychiatry:  AAO x   Gastrointestinal:  Soft, Non-tender, + BS	  Skin: No rashes, No ecchymoses, No cyanosis	  Neurologic:    Extremities:      Vascular Pulse Exam:  Right DP: []palpable []non-palpable []audible      Left DP :   []palpable []non-palpable []audible  Right PT: []palpable [] non-palpable []audible   Left PT:  [] palpable [] non-palpable []audible         Foot Exam:        LABS:	 	    CBC Full  -  ( 24 Sep 2019 06:09 )  WBC Count : 17.3 K/uL  Hemoglobin : 12.3 g/dL  Hematocrit : 40.4 %  Platelet Count - Automated : 234 K/uL  Mean Cell Volume : 93.0 fl  Mean Cell Hemoglobin : 28.2 pg  Mean Cell Hemoglobin Concentration : 30.4 gm/dL  Auto Neutrophil # : x  Auto Lymphocyte # : x  Auto Monocyte # : x  Auto Eosinophil # : x  Auto Basophil # : x  Auto Neutrophil % : x  Auto Lymphocyte % : x  Auto Monocyte % : x  Auto Eosinophil % : x  Auto Basophil % : x    09-24    137  |  101  |  18  ----------------------------<  149<H>  4.4   |  24  |  0.99  09-23    139  |  100  |  22  ----------------------------<  203<H>  5.3   |  23  |  1.36<H>    Ca    9.1      24 Sep 2019 06:09  Ca    8.7      23 Sep 2019 19:17  Phos  2.9     09-24  Mg     1.7     09-24    TPro  7.9  /  Alb  3.7  /  TBili  0.6  /  DBili  x   /  AST  111<H>  /  ALT  92<H>  /  AlkPhos  98  09-23    HS trop 121 -> 993  ProBNP 662    CTA Chest 9/24/19  < from: CT Angio Chest w/ IV Cont (09.24.19 @ 00:26) >  IMPRESSION:     Pulmonary emboli in the bilateral main pulmonary arteries extending into   all lobar branches as well as several segmental and subsegmental   pulmonary arteries. Mild increase in the right ventricular to left   ventricular ratio concerning for right heart strain. Echocardiogram can   be performed for further evaluation as clinically indicated    < end of copied text >        Assessment:  75 y/o F with PMH of HTN, HLD, T2DM, ovarian cancer s/p BSO-BRET and chemo in 2007, and gout who presented to [facility] with acute onset SOB and was transferred to [facility] for acute PE with RV strain. Vascular cardiology consulted for further management.     1. Acute bilateral PE - history of ovarian cancer, also recently bed bound for gout flare   - Submassive with elevated HS troponin and proBNP  - Hemodynamically stable  2. STORMY  3. History of ovarian cancer  4. Hypertensive urgency      Plan:  1. Continue heparin gtt, no advanced therapies planned at this time  2. Check TTE to assess RV function  3. Recommend bilateral LE duplex US  4. Consider CT A/P with PO and IV contrast tomorrow if Cr stable given her history of ovarian cancer       Thank you,    ALAN Treadwell MD  Cardiology Fellow  Vascular Cardiology Service    Please call with any questions:   SPECTRA - [phone]  Office [phone]  email:  [email or web address] Vascular Cardiology Consult Note     SPECTRA [id number]              EMAIL [email or web address]   OFFICE [phone]    CC: Shortness of breath    HPI:    73 y/o F with PMH of HTN, HLD, T2DM, ovarian cancer with metastases s/p BSO-BRET and chemo in 2007, and gout who presented to [facility] with acute onset SOB and was transferred to [facility] for acute PE with RV strain. Vascular cardiology consulted for further management.     The patient developed acute onset shortness of breath on the morning of presentation, 9/23. Never occurred before. Patient notes R leg swelling x2-3 weeks and recent gout flare that left her bedbound. She has no personal or family history of PE/DVT. Non-smoker. No recent travel.     Per chart, patient hypoxic to SpO2 65% for EMS, improved to SpO2 88% with BiPAP. SBP 130s. CTA with pulmonary emboli in the bilateral main PAs extending into all lobar branches, as well as several segmental and subsegmental PAs. Mild increase in RV to LV ration, concerning for right heart strain. She was transferred to [facility] for further evaluation by PERT team. Upon arrival to [facility], she was hypertensive (/120) and tachypneic with SpO2 to low 90s on 4L NC. She was started on a Cardene gtt and continued on BiPAP.     Allergies    No Known Allergies    Intolerances    	    MEDICATIONS:  heparin  Infusion. 1200 Unit(s)/Hr IV Continuous <Continuous>  heparin  Injectable 6500 Unit(s) IV Push every 6 hours PRN  heparin  Injectable 3000 Unit(s) IV Push every 6 hours PRN  niCARdipine Infusion 5 mG/Hr IV Continuous <Continuous>  NIFEdipine XL 90 milliGRAM(s) Oral daily  triamterene 37.5 mG/hydrochlorothiazide 25 mG Capsule 1 Capsule(s) Oral daily  pantoprazole    Tablet 40 milliGRAM(s) Oral before breakfast  atorvastatin 20 milliGRAM(s) Oral at bedtime  dextrose 40% Gel 15 Gram(s) Oral once PRN  dextrose 50% Injectable 12.5 Gram(s) IV Push once  dextrose 50% Injectable 25 Gram(s) IV Push once  dextrose 50% Injectable 25 Gram(s) IV Push once  glucagon  Injectable 1 milliGRAM(s) IntraMuscular once PRN  insulin lispro (HumaLOG) corrective regimen sliding scale   SubCutaneous every 6 hours  chlorhexidine 2% Cloths 1 Application(s) Topical <User Schedule>  dextrose 5%. 1000 milliLiter(s) IV Continuous <Continuous>      PAST MEDICAL & SURGICAL HISTORY:  Ovarian ca  Cataract  Acid reflux  Hypertension  Hyperlipidemia  Gout  S/P cholecystectomy  S/P hysterectomy  S/P ovarian cystectomy      FAMILY HISTORY:      SOCIAL HISTORY:  unchanged    REVIEW OF SYSTEMS:  CONSTITUTIONAL: No fever, weight loss, or fatigue  EYES: No eye pain, visual disturbances, or discharge  ENMT:  No difficulty hearing, tinnitus, vertigo; No sinus or throat pain  NECK: No pain or stiffness  RESPIRATORY:    CARDIOVASCULAR:    GASTROINTESTINAL: No abdominal or epigastric pain. No nausea, vomiting, or hematemesis; No diarrhea or constipation. No melena or hematochezia.  GENITOURINARY: No dysuria, frequency, hematuria, or incontinence  NEUROLOGICAL: No headaches, memory loss, loss of strength, numbness, or tremors  SKIN:   LYMPH Nodes: No enlarged glands  ENDOCRINE: No heat or cold intolerance; No hair loss  MUSCULOSKELETAL: No joint pain or swelling; No muscle, back, or extremity pain  PSYCHIATRIC: No depression, anxiety, mood swings, or difficulty sleeping  HEME/LYMPH: No easy bruising, or bleeding gums  ALLERY AND IMMUNOLOGIC: No hives or eczema	    [ x] All others negative	  [ ] Unable to obtain    PHYSICAL EXAM:  T(C): 37.1 (09-24-19 @ 05:15), Max: 37.1 (09-24-19 @ 05:15)  HR: 75 (09-24-19 @ 07:30) (75 - 91)  BP: 133/75 (09-24-19 @ 07:30) (127/90 - 204/120)  RR: 21 (09-24-19 @ 07:30) (16 - 35)  SpO2: 94% (09-24-19 @ 07:30) (92% - 100%)  Wt(kg): --  I&O's Summary    23 Sep 2019 07:01  -  24 Sep 2019 07:00  --------------------------------------------------------  IN: 74 mL / OUT: 400 mL / NET: -326 mL    24 Sep 2019 07:01  -  24 Sep 2019 07:57  --------------------------------------------------------  IN: 50 mL / OUT: 0 mL / NET: 50 mL        Appearance:  	  HEENT:   Normal oral mucosa, PERRL, EOMI	  Carotid:   Right:    Left:    Lymphatic: No lymphadenopathy  Cardiovascular:    Respiratory:  	  Psychiatry:  AAO x   Gastrointestinal:  Soft, Non-tender, + BS	  Skin: No rashes, No ecchymoses, No cyanosis	  Neurologic:    Extremities:      Vascular Pulse Exam:  Right DP: []palpable []non-palpable []audible      Left DP :   []palpable []non-palpable []audible  Right PT: []palpable [] non-palpable []audible   Left PT:  [] palpable [] non-palpable []audible         Foot Exam:        LABS:	 	    CBC Full  -  ( 24 Sep 2019 06:09 )  WBC Count : 17.3 K/uL  Hemoglobin : 12.3 g/dL  Hematocrit : 40.4 %  Platelet Count - Automated : 234 K/uL  Mean Cell Volume : 93.0 fl  Mean Cell Hemoglobin : 28.2 pg  Mean Cell Hemoglobin Concentration : 30.4 gm/dL  Auto Neutrophil # : x  Auto Lymphocyte # : x  Auto Monocyte # : x  Auto Eosinophil # : x  Auto Basophil # : x  Auto Neutrophil % : x  Auto Lymphocyte % : x  Auto Monocyte % : x  Auto Eosinophil % : x  Auto Basophil % : x    09-24    137  |  101  |  18  ----------------------------<  149<H>  4.4   |  24  |  0.99  09-23    139  |  100  |  22  ----------------------------<  203<H>  5.3   |  23  |  1.36<H>    Ca    9.1      24 Sep 2019 06:09  Ca    8.7      23 Sep 2019 19:17  Phos  2.9     09-24  Mg     1.7     09-24    TPro  7.9  /  Alb  3.7  /  TBili  0.6  /  DBili  x   /  AST  111<H>  /  ALT  92<H>  /  AlkPhos  98  09-23    HS trop 121 -> 993  ProBNP 662    CTA Chest 9/24/19  < from: CT Angio Chest w/ IV Cont (09.24.19 @ 00:26) >  IMPRESSION:     Pulmonary emboli in the bilateral main pulmonary arteries extending into   all lobar branches as well as several segmental and subsegmental   pulmonary arteries. Mild increase in the right ventricular to left   ventricular ratio concerning for right heart strain. Echocardiogram can   be performed for further evaluation as clinically indicated    < end of copied text >        Assessment:  73 y/o F with PMH of HTN, HLD, T2DM, ovarian cancer s/p BSO-BRET and chemo in 2007, and gout who presented to [facility] with acute onset SOB and was transferred to [facility] for acute PE with RV strain. Vascular cardiology consulted for further management.     1. Acute bilateral PE - history of ovarian cancer, also recently bed bound for gout flare   - Submassive with elevated HS troponin and proBNP  - Hypertensive  2. STORMY  3. History of ovarian cancer  4. Hypertensive urgency      Plan:  1. Continue heparin gtt, no advanced therapies planned at this time  2. Check TTE to assess RV function  3. Recommend bilateral LE duplex US  4. Consider CT A/P with PO and IV contrast tomorrow (9/25) if Cr stable given her history of ovarian cancer  5. Discontinue BiPAP and transition to nasal cannula, goal SpO2 >90%  6. Bedrest today       Thank you,    ALAN Treadwell MD  Cardiology Fellow  [phone]  Vascular Cardiology Service    Please call with any questions:   SPECTRA - [phone]  Office [phone]  email:  [email or web address]

## 2019-09-24 NOTE — H&P ADULT - ASSESSMENT
A/P  74F Hx HTN, HLD, DM2 who was transferred from [facility] for saddle PE with RV strain.  Off Bipap, on 4L NC with SP02 > 92%.  HTN emergency requiring Cardene gtt.

## 2019-09-24 NOTE — CONSULT NOTE ADULT - SUBJECTIVE AND OBJECTIVE BOX
HISTORY OF PRESENT ILLNESS:    Patient is a 74y old  Female who presents with a chief complaint of SOB (24 Sep 2019 01:30)    HPI: 74F PMH HTN, HLD, DM, CKD, Gout, Ovarian Cancer (2007), Colon Ca (2007) Chemo in past, cancer in remission, vertigo presenting with c/o SOB and chest tightness x  1 hr. Patient stated she was fine upon awakening yesterday, but started becoming acutely SOB this afternoon. Patient also c/o chest tightness, feeling chills, diaphoresis and grand daughter noticed patient turning blue. FS was 160. Family called EMS. Initial sat 65%, improved to 76 with NRB, Started CPAP 88%. Patient feeling better on CPAP. Pt got  by EMS.  EKG in ER showed SR at 91, (+)RBBB, new from 2014. Started heparin gtt for concern for PE. CTA with B/L large PE in main pulm vessels as well as concern for right heart strain. CCU consulted     Allergies  No Known Allergies    MEDICATIONS  heparin  Infusion.  Unit(s)/Hr IV Continuous <Continuous>  heparin  Injectable 6500 Unit(s) IV Push every 6 hours PRN  heparin  Injectable 3000 Unit(s) IV Push every 6 hours PRN    PAST MEDICAL & SURGICAL HISTORY:  Ovarian ca  Cataract  Acid reflux  Hypertension  Hypertension  Hyperlipidemia  Gout  S/P cholecystectomy  S/P hysterectomy  S/P ovarian cystectomy    SOCIAL HISTORY  Marital Status:   Occupation: Retired nurse  Lives with: granddaughter    SUBSTANCE USE  Tobacco Usage:  (x ) never smoked   ( ) former smoker  ( ) current smoker; Packs per day:   Alcohol Usage: ( x) none  ( ) occasional ( ) 2-3 times a week ( ) daily; Last drink:   Recreational drugs (x ) None    REVIEW OF SYSTEMS:  CONSTITUTIONAL: No fevers, + chills, No fatigue, No weight gain  EYES: No vision changes   ENT: No congestion, No ear pain, No sore throat.  NECK: No pain, No stiffness  RESPIRATORY: + shortness of breath, + cyanosis, No cough, No wheezing, No hemoptysis  CARDIOVASCULAR: No chest pain. No palpitations, No MIMS, No orthopnea, No paroxysmal nocturnal dyspnea, No pleuritic pain  GASTROINTESTINAL: No abdominal pain, No nausea, No vomiting, No hematemesis, No diarrhea No constipation. No melena  GENITOURINARY: No dysuria, No frequency, No incontinence, No hematuria  NEUROLOGICAL: No dizziness, No lightheadedness, No syncope, No LOC, No headache, No numbness or weakness  EXTREMITIES: No Edema, No joint pain, No joint swelling.  PSYCHIATRIC: No anxiety, No depression  SKIN: + diaphoresis. No itching, No rashes, No pressure ulcers  HEME/LYMPH: No easy bruising, or bleeding gums  ALLERY AND IMMUNOLOGIC: No hives or eczema    All other review of systems is negative unless indicated above.  VITAL SIGNS  T(C): 37 (09-23-19 @ 23:50), Max: 37 (09-23-19 @ 23:50)  HR: 83 (09-23-19 @ 23:50) (83 - 91)  BP: 166/95 (09-23-19 @ 23:50) (127/90 - 166/95)  RR: 25 (09-23-19 @ 23:50) (16 - 28)  SpO2: 97% (09-23-19 @ 23:50) (94% - 100%)  Wt(kg): --    PHYSICAL EXAM:  Appearance: NAD, mild respiratory distress on BiPAP  HEENT:   Normal oral mucosa, PERRL, EOMI  Cardiovascular: Regular rate and rhythm, Normal S1 S2, No JVD, No murmurs  Respiratory: Lungs clear to auscultation. No rales, No rhonchi, No wheezing. No tenderness to palpation  Gastrointestinal:  Soft, Non-tender, + BS  Neurologic: Non-focal, A&Ox3  Skin: Warm and dry, No rashes, No ecchymosis, No cyanosis  Extremities: No clubbing, No cyanosis, +1-2 edema  Vascular: Peripheral pulses palpable 2+ bilaterally  Psychiatry: Mood & affect appropriate    LABORATORY VALUES	 	              12.4   17.31 )-----------( 228      ( 23 Sep 2019 19:17 )             40.7   09-23    139  |  100  |  22  ----------------------------<  203<H>  5.3   |  23  |  1.36<H>    Ca    8.7      23 Sep 2019 19:17    TPro  7.9  /  Alb  3.7  /  TBili  0.6  /  DBili  x   /  AST  111<H>  /  ALT  92<H>  /  AlkPhos  98  09-23  LIVER FUNCTIONS - ( 23 Sep 2019 19:17 )  Alb: 3.7 g/dL / Pro: 7.9 g/dL / ALK PHOS: 98 u/L / ALT: 92 u/L / AST: 111 u/L / GGT: x         Prothrombin Time, Plasma: 12.7 SEC (09-23 @ 19:15)    CARDIAC MARKERS:  Troponin T, High Sensitivity: 993 ng/L (09-23-19 @ 22:39)  Troponin T, High Sensitivity: 121 ng/L (09-23-19 @ 19:17)  Serum Pro-Brain Natriuretic Peptide: 662.2 pg/mL (09-23 @ 19:17)  Blood Gas Venous - Lactate: 3.8 mmol/L (09-23 @ 19:15)    TELEMETRY: 	    ECG:  	  RADIOLOGY:  OTHER: 	    PREVIOUS DIAGNOSTIC TESTING:    [ ] Echocardiogram:   [ ] Catheterization:  [ ] Stress Test:  	    ASSESSMENT AND PLAN  74F PMH HTN, HLD, DM, CKD, Gout, Ovarian Cancer (2007), Colon Ca (2007) Chemo in past, cancer in remission, vertigo presenting with c/o SOB and chest tightness x  1 hr, found to be hypoxic with CTA showing B/L large PE in main pulm vessels as well as concern for right heart strain.    PLAN  Patient currently saturating well on BiPAP. On heparin gtt. Called PERT attending Dr Matute who recommended transfer to [facility]U for monitoring and possible intervention after reassessment. Plan discussed with ER and cardiology fellow at [facility]. Fatuma stapleton has a bed at [facility]U.       # [id number]

## 2019-09-24 NOTE — H&P ADULT - PROBLEM SELECTOR PLAN 1
Full Heparin gtt per protocol   Hold off on TPA catheter directed tx  Hypercoagulable work up   Malignancy screening  Obtain TTE to eval RV  AB.45/40/65/27/93% on 4L NC  Place back on Bipap 10/5  Trend lactate (Lactate 1.5)  Trend BMP/Mag/Phos  Active Type & Screen

## 2019-09-24 NOTE — H&P ADULT - PROBLEM SELECTOR PLAN 2
Start Cardene gtt (/100); asymptomatic   Restart home BP meds, titrate off gtt   Goal SBP 140s  HTN w/u

## 2019-09-24 NOTE — H&P ADULT - HISTORY OF PRESENT ILLNESS
This is a 74 year old female with PMH of HTN, HLD, DM2, Ovarian CA s/p BRET who was transferred from [facility] CCU for Saddle PE w/ RV strain.  Patient states she was home yesterday afternoon when she experienced acute shortness of breath accompanied by midsternal chest pain and diaphoresis.  Chest pain described as "4/10 dull non-radiating continuous mid sternal chest pain" which increased with inspiration.  Patient called her daughter at that time who called EMS who brought patient to [facility] ER.  Chest CTA revealing saddle PE extending into all lobar branches as well as several segmental/subsegmental branches with RV strain.  Patient started on Full Heparin gtt and transferred to CCU2 for continuum of care.  Upon transfer, patient was noted to have HTN emergency with SBP 200s for which she was started on a Cardene gtt.  Currently patient remains off Bipap, on 4L NC with Sp02 92-95%.  Still states +SOB, denies chest pain, palpitations, abd pain, n/v/d/c.  Denies any personal or family history of PE or DVT in the past. This is a 74 year old female with PMH of HTN, HLD, DM2, Ovarian CA s/p BRET who was transferred from [facility] CCU for Saddle PE w/ RV strain.  Patient states she was home yesterday afternoon when she experienced acute shortness of breath accompanied by midsternal chest pain and diaphoresis.  Chest pain described as "midsternal 4/10 dull non-radiating continuous pain" which increasing with inspiration.  Patient called her daughter at that time who called EMS who brought patient to [facility] ER.  Chest CTA revealing saddle PE extending into all lobar branches as well as several segmental/subsegmental branches with RV strain.  Patient started on Full Heparin gtt and transferred to CCU2 for continuum of care.  Upon transfer, patient was noted to have HTN emergency with SBP 200s for which she was started on a Cardene gtt.  Currently patient remains off Bipap, on 4L NC with Sp02 92-95%.  Still states +SOB, denies chest pain, palpitations, abd pain, n/v/d/c.  Denies any personal or family history of PE or DVT in the past. This is a 74 year old  female with PMH of HTN, HLD, DM2, Ovarian CA s/p BRET who was transferred from [facility] CCU for Saddle PE w/ RV strain.  Patient states she was home yesterday afternoon when she experienced acute shortness of breath accompanied by midsternal chest pain and diaphoresis.  Chest pain described as "midsternal 4/10 dull non-radiating continuous pain" which increasing with inspiration.  Patient called her daughter at that time who called EMS who brought patient to [facility] ER.  Chest CTA revealing saddle PE extending into all lobar branches as well as several segmental/subsegmental branches with RV strain.  Patient started on Full Heparin gtt and transferred to CCU2 for continuum of care.  Upon transfer, patient was noted to have HTN emergency with SBP 200s for which she was started on a Cardene gtt.  Currently patient remains off Bipap, on 4L NC with Sp02 92-95%.  Still states +SOB, denies chest pain, palpitations, abd pain, n/v/d/c.  Denies any personal or family history of PE or DVT in the past. This is a 74 year old  female with PMH of HTN, HLD, DM2, Ovarian CA s/p BRET who was transferred from [facility] CCU for Saddle PE w/ RV strain.  Patient states she was home yesterday afternoon when she experienced acute shortness of breath accompanied by midsternal chest pain and diaphoresis.  Chest pain described as "midsternal 4/10 dull non-radiating continuous pain" increases with inspiration.  Patient called her daughter at that time who called EMS who brought patient to [facility] ER.  Chest CTA revealing saddle PE extending into all lobar branches as well as several segmental/subsegmental branches with RV strain.  Patient started on Full Heparin gtt and transferred to CCU2 for continuum of care.  Upon transfer, patient was noted to have HTN emergency with SBP 200s for which she was started on a Cardene gtt.  Currently patient remains off Bipap, on 4L NC with Sp02 92-95%.  Still states +SOB, denies chest pain, palpitations, abd pain, n/v/d/c.  Denies any personal or family history of PE or DVT in the past. This is a 74 year old  female with PMH of HTN, HLD, DM2, Ovarian CA s/p BRET who was transferred from [facility] CCU for Saddle PE w/ RV strain.  Patient states she was home yesterday afternoon when she experienced acute shortness of breath accompanied by midsternal chest pain and diaphoresis.  Chest pain described as "midsternal 4/10 dull non-radiating continuous pain" and increases with inspiration.  EMS called who brought patient to [facility] ER.  S/P Chest CTA revealing saddle PE extending into all lobar branches as well as several segmental/subsegmental branches with RV strain.  Patient started on a Heparin gtt and Bipap for acute respiratory distress.  She was than transferred to [facility] CCU2 for continuum of care.  Upon transfer, patient was noted to have HTN emergency with /100 for which she was started on a Cardene gtt.  Currently patient remains off Bipap, on 4L NC with Sp02 92-95%.  Still states +SOB, denies chest pain, palpitations, abd pain, n/v/d/c.  Denies any personal or family history of PE or DVT in the past.  Full code.

## 2019-09-25 LAB
ALBUMIN SERPL ELPH-MCNC: 3.9 G/DL — SIGNIFICANT CHANGE UP (ref 3.3–5)
ALP SERPL-CCNC: 120 U/L — SIGNIFICANT CHANGE UP (ref 40–120)
ALT FLD-CCNC: 115 U/L — HIGH (ref 10–45)
ANION GAP SERPL CALC-SCNC: 11 MMOL/L — SIGNIFICANT CHANGE UP (ref 5–17)
ANION GAP SERPL CALC-SCNC: 13 MMOL/L — SIGNIFICANT CHANGE UP (ref 5–17)
ANISOCYTOSIS BLD QL: SLIGHT — SIGNIFICANT CHANGE UP
APTT BLD: 144.3 SEC — CRITICAL HIGH (ref 27.5–36.3)
APTT BLD: 69.1 SEC — HIGH (ref 27.5–36.3)
APTT BLD: 95.1 SEC — HIGH (ref 27.5–36.3)
AST SERPL-CCNC: 150 U/L — HIGH (ref 10–40)
BASOPHILS # BLD AUTO: 0.1 K/UL — SIGNIFICANT CHANGE UP (ref 0–0.2)
BASOPHILS NFR BLD AUTO: 0.6 % — SIGNIFICANT CHANGE UP (ref 0–2)
BILIRUB SERPL-MCNC: 0.6 MG/DL — SIGNIFICANT CHANGE UP (ref 0.2–1.2)
BUN SERPL-MCNC: 14 MG/DL — SIGNIFICANT CHANGE UP (ref 7–23)
BUN SERPL-MCNC: 16 MG/DL — SIGNIFICANT CHANGE UP (ref 7–23)
BURR CELLS BLD QL SMEAR: PRESENT — SIGNIFICANT CHANGE UP
CALCIUM SERPL-MCNC: 9.3 MG/DL — SIGNIFICANT CHANGE UP (ref 8.4–10.5)
CALCIUM SERPL-MCNC: 9.3 MG/DL — SIGNIFICANT CHANGE UP (ref 8.4–10.5)
CHLORIDE SERPL-SCNC: 97 MMOL/L — SIGNIFICANT CHANGE UP (ref 96–108)
CHLORIDE SERPL-SCNC: 98 MMOL/L — SIGNIFICANT CHANGE UP (ref 96–108)
CO2 SERPL-SCNC: 28 MMOL/L — SIGNIFICANT CHANGE UP (ref 22–31)
CO2 SERPL-SCNC: 28 MMOL/L — SIGNIFICANT CHANGE UP (ref 22–31)
CREAT SERPL-MCNC: 1.01 MG/DL — SIGNIFICANT CHANGE UP (ref 0.5–1.3)
CREAT SERPL-MCNC: 1.07 MG/DL — SIGNIFICANT CHANGE UP (ref 0.5–1.3)
DACRYOCYTES BLD QL SMEAR: SLIGHT — SIGNIFICANT CHANGE UP
EOSINOPHIL # BLD AUTO: 0.4 K/UL — SIGNIFICANT CHANGE UP (ref 0–0.5)
EOSINOPHIL NFR BLD AUTO: 2.3 % — SIGNIFICANT CHANGE UP (ref 0–6)
GLUCOSE BLDC GLUCOMTR-MCNC: 109 MG/DL — HIGH (ref 70–99)
GLUCOSE BLDC GLUCOMTR-MCNC: 161 MG/DL — HIGH (ref 70–99)
GLUCOSE BLDC GLUCOMTR-MCNC: 202 MG/DL — HIGH (ref 70–99)
GLUCOSE SERPL-MCNC: 118 MG/DL — HIGH (ref 70–99)
GLUCOSE SERPL-MCNC: 166 MG/DL — HIGH (ref 70–99)
HCT VFR BLD CALC: 41.5 % — SIGNIFICANT CHANGE UP (ref 34.5–45)
HGB BLD-MCNC: 12.9 G/DL — SIGNIFICANT CHANGE UP (ref 11.5–15.5)
HYPOCHROMIA BLD QL: SLIGHT — SIGNIFICANT CHANGE UP
INR BLD: 1.17 RATIO — HIGH (ref 0.88–1.16)
INR BLD: 1.21 RATIO — HIGH (ref 0.88–1.16)
LYMPHOCYTES # BLD AUTO: 45.5 % — HIGH (ref 13–44)
LYMPHOCYTES # BLD AUTO: 8 K/UL — HIGH (ref 1–3.3)
MAGNESIUM SERPL-MCNC: 1.9 MG/DL — SIGNIFICANT CHANGE UP (ref 1.6–2.6)
MAGNESIUM SERPL-MCNC: 2.1 MG/DL — SIGNIFICANT CHANGE UP (ref 1.6–2.6)
MCHC RBC-ENTMCNC: 28.9 PG — SIGNIFICANT CHANGE UP (ref 27–34)
MCHC RBC-ENTMCNC: 31 GM/DL — LOW (ref 32–36)
MCV RBC AUTO: 93.3 FL — SIGNIFICANT CHANGE UP (ref 80–100)
MONOCYTES # BLD AUTO: 1.1 K/UL — HIGH (ref 0–0.9)
MONOCYTES NFR BLD AUTO: 6.2 % — SIGNIFICANT CHANGE UP (ref 2–14)
NEUTROPHILS # BLD AUTO: 8 K/UL — HIGH (ref 1.8–7.4)
NEUTROPHILS NFR BLD AUTO: 45.3 % — SIGNIFICANT CHANGE UP (ref 43–77)
OVALOCYTES BLD QL SMEAR: SLIGHT — SIGNIFICANT CHANGE UP
PHOSPHATE SERPL-MCNC: 3.1 MG/DL — SIGNIFICANT CHANGE UP (ref 2.5–4.5)
PHOSPHATE SERPL-MCNC: 3.4 MG/DL — SIGNIFICANT CHANGE UP (ref 2.5–4.5)
PLAT MORPH BLD: NORMAL — SIGNIFICANT CHANGE UP
PLATELET # BLD AUTO: 234 K/UL — SIGNIFICANT CHANGE UP (ref 150–400)
POIKILOCYTOSIS BLD QL AUTO: SLIGHT — SIGNIFICANT CHANGE UP
POLYCHROMASIA BLD QL SMEAR: SLIGHT — SIGNIFICANT CHANGE UP
POTASSIUM SERPL-MCNC: 4 MMOL/L — SIGNIFICANT CHANGE UP (ref 3.5–5.3)
POTASSIUM SERPL-MCNC: 4.2 MMOL/L — SIGNIFICANT CHANGE UP (ref 3.5–5.3)
POTASSIUM SERPL-SCNC: 4 MMOL/L — SIGNIFICANT CHANGE UP (ref 3.5–5.3)
POTASSIUM SERPL-SCNC: 4.2 MMOL/L — SIGNIFICANT CHANGE UP (ref 3.5–5.3)
PROT SERPL-MCNC: 7.7 G/DL — SIGNIFICANT CHANGE UP (ref 6–8.3)
PROTHROM AB SERPL-ACNC: 13.5 SEC — HIGH (ref 10–12.9)
PROTHROM AB SERPL-ACNC: 13.9 SEC — HIGH (ref 10–12.9)
RBC # BLD: 4.45 M/UL — SIGNIFICANT CHANGE UP (ref 3.8–5.2)
RBC # FLD: 13 % — SIGNIFICANT CHANGE UP (ref 10.3–14.5)
RBC BLD AUTO: ABNORMAL
SCHISTOCYTES BLD QL AUTO: SLIGHT — SIGNIFICANT CHANGE UP
SMUDGE CELLS # BLD: PRESENT — SIGNIFICANT CHANGE UP
SODIUM SERPL-SCNC: 137 MMOL/L — SIGNIFICANT CHANGE UP (ref 135–145)
SODIUM SERPL-SCNC: 138 MMOL/L — SIGNIFICANT CHANGE UP (ref 135–145)
WBC # BLD: 17.6 K/UL — HIGH (ref 3.8–10.5)
WBC # FLD AUTO: 17.6 K/UL — HIGH (ref 3.8–10.5)

## 2019-09-25 PROCEDURE — 99291 CRITICAL CARE FIRST HOUR: CPT

## 2019-09-25 PROCEDURE — 93010 ELECTROCARDIOGRAM REPORT: CPT

## 2019-09-25 PROCEDURE — 74177 CT ABD & PELVIS W/CONTRAST: CPT | Mod: 26

## 2019-09-25 RX ORDER — ACETAMINOPHEN 500 MG
975 TABLET ORAL ONCE
Refills: 0 | Status: COMPLETED | OUTPATIENT
Start: 2019-09-25 | End: 2019-09-25

## 2019-09-25 RX ADMIN — Medication 90 MILLIGRAM(S): at 11:56

## 2019-09-25 RX ADMIN — HEPARIN SODIUM 900 UNIT(S)/HR: 5000 INJECTION INTRAVENOUS; SUBCUTANEOUS at 02:12

## 2019-09-25 RX ADMIN — ATORVASTATIN CALCIUM 20 MILLIGRAM(S): 80 TABLET, FILM COATED ORAL at 21:35

## 2019-09-25 RX ADMIN — HEPARIN SODIUM 600 UNIT(S)/HR: 5000 INJECTION INTRAVENOUS; SUBCUTANEOUS at 20:03

## 2019-09-25 RX ADMIN — Medication 975 MILLIGRAM(S): at 18:31

## 2019-09-25 RX ADMIN — HEPARIN SODIUM 0 UNIT(S)/HR: 5000 INJECTION INTRAVENOUS; SUBCUTANEOUS at 12:02

## 2019-09-25 RX ADMIN — Medication 975 MILLIGRAM(S): at 17:38

## 2019-09-25 RX ADMIN — PANTOPRAZOLE SODIUM 40 MILLIGRAM(S): 20 TABLET, DELAYED RELEASE ORAL at 05:21

## 2019-09-25 RX ADMIN — Medication 1 CAPSULE(S): at 10:52

## 2019-09-25 RX ADMIN — Medication 2: at 17:39

## 2019-09-25 RX ADMIN — HEPARIN SODIUM 600 UNIT(S)/HR: 5000 INJECTION INTRAVENOUS; SUBCUTANEOUS at 13:08

## 2019-09-25 NOTE — PROGRESS NOTE ADULT - SUBJECTIVE AND OBJECTIVE BOX
Vascular Cardiology  Progress note     [facility] [id number]              EMAIL [email or web address]   OFFICE [phone]    CC: Shortness of breath    INTERVAL HISTORY:  Patient's breathing is much improved. She denies SOB, chest pain, palpitations, LE edema/pain.          Allergies    No Known Allergies    Intolerances    	    MEDICATIONS:  heparin  Infusion. 1200 Unit(s)/Hr IV Continuous <Continuous>  heparin  Injectable 6500 Unit(s) IV Push every 6 hours PRN  heparin  Injectable 3000 Unit(s) IV Push every 6 hours PRN  NIFEdipine XL 90 milliGRAM(s) Oral daily  triamterene 37.5 mG/hydrochlorothiazide 25 mG Capsule 1 Capsule(s) Oral daily  pantoprazole    Tablet 40 milliGRAM(s) Oral before breakfast  atorvastatin 20 milliGRAM(s) Oral at bedtime  dextrose 40% Gel 15 Gram(s) Oral once PRN  dextrose 50% Injectable 12.5 Gram(s) IV Push once  dextrose 50% Injectable 25 Gram(s) IV Push once  dextrose 50% Injectable 25 Gram(s) IV Push once  glucagon  Injectable 1 milliGRAM(s) IntraMuscular once PRN  insulin lispro (HumaLOG) corrective regimen sliding scale   SubCutaneous three times a day before meals  insulin lispro (HumaLOG) corrective regimen sliding scale   SubCutaneous at bedtime  chlorhexidine 2% Cloths 1 Application(s) Topical <User Schedule>  dextrose 5%. 1000 milliLiter(s) IV Continuous <Continuous>      PAST MEDICAL & SURGICAL HISTORY:  Ovarian ca  Cataract  Acid reflux  Hypertension  Hyperlipidemia  Gout  S/P cholecystectomy  S/P hysterectomy  S/P ovarian cystectomy      FAMILY HISTORY:  No FMH PE/DVT    SOCIAL HISTORY:  unchanged    REVIEW OF SYSTEMS:  CONSTITUTIONAL: No fever, weight loss, or fatigue  EYES: No eye pain, visual disturbances, or discharge  ENMT:  No difficulty hearing, tinnitus, vertigo; No sinus or throat pain  NECK: No pain or stiffness  RESPIRATORY: No cough, wheezing, chills or hemoptysis; No Shortness of Breath  CARDIOVASCULAR: No chest pain, palpitations, passing out, dizziness, or leg swelling  GASTROINTESTINAL: No abdominal or epigastric pain. No nausea, vomiting, or hematemesis; No diarrhea or constipation. No melena or hematochezia.  GENITOURINARY: No dysuria, frequency, hematuria, or incontinence  NEUROLOGICAL: No headaches, memory loss, loss of strength, numbness, or tremors  SKIN: No itching, burning, rashes, or lesions   LYMPH Nodes: No enlarged glands  ENDOCRINE: No heat or cold intolerance; No hair loss  MUSCULOSKELETAL: No joint pain or swelling; No muscle, back, or extremity pain  PSYCHIATRIC: No depression, anxiety, mood swings, or difficulty sleeping  HEME/LYMPH: No easy bruising, or bleeding gums  ALLERY AND IMMUNOLOGIC: No hives or eczema	    [ x] All others negative	  [ ] Unable to obtain    PHYSICAL EXAM:  T(C): 37 (09-25-19 @ 08:00), Max: 37.5 (09-24-19 @ 23:00)  HR: 75 (09-25-19 @ 10:00) (68 - 82)  BP: 139/80 (09-25-19 @ 10:00) (110/70 - 158/100)  RR: 20 (09-25-19 @ 10:00) (18 - 30)  SpO2: 95% (09-25-19 @ 10:00) (90% - 95%)  Wt(kg): --  I&O's Summary    24 Sep 2019 07:01  -  25 Sep 2019 07:00  --------------------------------------------------------  IN: 994.5 mL / OUT: 2125 mL / NET: -1130.5 mL    25 Sep 2019 07:01  -  25 Sep 2019 10:12  --------------------------------------------------------  IN: 27 mL / OUT: 0 mL / NET: 27 mL        Appearance: Normal	  HEENT:   Normal oral mucosa, PERRL, EOMI	  Carotid:  Right: No bruit    Left:  No bruit  Lymphatic: No lymphadenopathy  Cardiovascular: Normal S1 S2, No JVD, No murmurs, No LE edema  Respiratory: Lungs clear to auscultation; patient on 4L NC with SpO2 93-94%, SpO2 drops to 89% on 1L NC  Psychiatry: A & O x 3, Mood & affect appropriate  Gastrointestinal:  Soft, Non-tender, + BS	  Skin: No rashes, No ecchymoses, No cyanosis	  Neurologic: Non-focal  Extremities: Normal range of motion, No clubbing, cyanosis.    LABS:	 	    CBC Full  -  ( 24 Sep 2019 23:41 )  WBC Count : 17.1 K/uL  Hemoglobin : 13.1 g/dL  Hematocrit : 41.1 %  Platelet Count - Automated : 223 K/uL  Mean Cell Volume : 92.8 fl  Mean Cell Hemoglobin : 29.6 pg  Mean Cell Hemoglobin Concentration : 32.0 gm/dL  Auto Neutrophil # : x  Auto Lymphocyte # : x  Auto Monocyte # : x  Auto Eosinophil # : x  Auto Basophil # : x  Auto Neutrophil % : x  Auto Lymphocyte % : x  Auto Monocyte % : x  Auto Eosinophil % : x  Auto Basophil % : x    09-24    138  |  97  |  16  ----------------------------<  166<H>  4.0   |  28  |  1.07  09-24    137  |  101  |  18  ----------------------------<  149<H>  4.4   |  24  |  0.99    Ca    9.3      24 Sep 2019 23:41  Ca    9.1      24 Sep 2019 06:09  Phos  3.1     09-24  Phos  2.9     09-24  Mg     2.1     09-24  Mg     1.7     09-24    TPro  7.7  /  Alb  3.9  /  TBili  0.6  /  DBili  x   /  AST  150<H>  /  ALT  115<H>  /  AlkPhos  120  09-24  TPro  7.9  /  Alb  3.7  /  TBili  0.6  /  DBili  x   /  AST  111<H>  /  ALT  92<H>  /  AlkPhos  98  09-23    PTT 95.1    HS trop 121 -> 993  ProBNP 662    CTA Chest 9/24/19  < from: CT Angio Chest w/ IV Cont (09.24.19 @ 00:26) >  IMPRESSION:     Pulmonary emboli in the bilateral main pulmonary arteries extending into   all lobar branches as well as several segmental and subsegmental   pulmonary arteries. Mild increase in the right ventricular to left   ventricular ratio concerning for right heart strain. Echocardiogram can   be performed for further evaluation as clinically indicated    < end of copied text >    Bilateral LE duplex US  < from: [facility] Duplex Lower Ext Vein Scan, Bilat (09.24.19 @ 14:06) >  IMPRESSION:     There is isolated occlusive thrombus of the left posterior tibial   peroneal trunk.    < end of copied text >    Echo 9/24/19  < from: TTE with Doppler (w/Cont) (09.24.19 @ 11:36) >  Conclusions:  1. Normal mitral valve. Mitral valve chordal systolic  anterior motion. Minimal mitral regurgitation.  2. Normal trileaflet aortic valve. No aortic valve  regurgitationseen.  3. Septal hypertrophy, the septum measures 1.8cm with mild  concentric left ventricular hypertrophy, consistent with  hypertrophic cardiomyopathy. Consider cardiac MRI for  further evaluation if clinically indicated.  4. Endocardial visualization enhanced with intravenous  injection of Ultrasonic Enhancing Agent (Definity). Normal  left ventricular systolic function. No segmental wall  motion abnormalities. Flattening of the interventricular  septum in both systole and diastole is  consistent with  right ventricular pressure overload.  5. Mild diastolic dysfunction (Stage I).  6. Mild ight ventricular systolic dysfunction with  hyperdynamic apical wall motion is seen consistent with  Bates's sign.  7. Normal pericardium with no pericardial effusion.  *** No previous Echo exam.    < end of copied text >        Assessment:  75 y/o F with PMH of HTN, HLD, T2DM, ovarian cancer s/p BSO-BRET and chemo in 2007, and gout who presented to [facility] with acute onset SOB and was transferred to [facility] for acute PE with RV strain. Vascular cardiology consulted for further management.     1. Acute bilateral PE - history of ovarian cancer, also recently bed bound for gout flare   - Submassive with elevated HS troponin and proBNP  - Hypertensive  2. L DVT of posterior tibial peroneal trunk  3. History of ovarian cancer  4. Hypertensive urgency  5. Septal hypertrophy concerning for hypertrophic cardiomyopathy    Plan:  1. Continue heparin gtt, no advanced therapies planned at this time  2. Check CT A/P with PO and IV contrast today given her history of ovarian cancer  3. Continue nasal cannula, goal SpO2 >90%    Thank you,    ALAN Treadwell MD  Cardiology Fellow  [phone]  Vascular Cardiology Service Vascular Cardiology  Progress note     [facility] [id number]              EMAIL [email or web address]   OFFICE [phone]    CC: Shortness of breath    INTERVAL HISTORY:  Patient's breathing is much improved. She denies SOB, chest pain, palpitations, LE edema/pain.          Allergies    No Known Allergies    Intolerances    	    MEDICATIONS:  heparin  Infusion. 1200 Unit(s)/Hr IV Continuous <Continuous>  heparin  Injectable 6500 Unit(s) IV Push every 6 hours PRN  heparin  Injectable 3000 Unit(s) IV Push every 6 hours PRN  NIFEdipine XL 90 milliGRAM(s) Oral daily  triamterene 37.5 mG/hydrochlorothiazide 25 mG Capsule 1 Capsule(s) Oral daily  pantoprazole    Tablet 40 milliGRAM(s) Oral before breakfast  atorvastatin 20 milliGRAM(s) Oral at bedtime  dextrose 40% Gel 15 Gram(s) Oral once PRN  dextrose 50% Injectable 12.5 Gram(s) IV Push once  dextrose 50% Injectable 25 Gram(s) IV Push once  dextrose 50% Injectable 25 Gram(s) IV Push once  glucagon  Injectable 1 milliGRAM(s) IntraMuscular once PRN  insulin lispro (HumaLOG) corrective regimen sliding scale   SubCutaneous three times a day before meals  insulin lispro (HumaLOG) corrective regimen sliding scale   SubCutaneous at bedtime  chlorhexidine 2% Cloths 1 Application(s) Topical <User Schedule>  dextrose 5%. 1000 milliLiter(s) IV Continuous <Continuous>      PAST MEDICAL & SURGICAL HISTORY:  Ovarian ca  Cataract  Acid reflux  Hypertension  Hyperlipidemia  Gout  S/P cholecystectomy  S/P hysterectomy  S/P ovarian cystectomy      FAMILY HISTORY:  No FMH PE/DVT    SOCIAL HISTORY:  unchanged    REVIEW OF SYSTEMS:  CONSTITUTIONAL: No fever, weight loss, or fatigue  EYES: No eye pain, visual disturbances, or discharge  ENMT:  No difficulty hearing, tinnitus, vertigo; No sinus or throat pain  NECK: No pain or stiffness  RESPIRATORY: No cough, wheezing, chills or hemoptysis; No Shortness of Breath  CARDIOVASCULAR: No chest pain, palpitations, passing out, dizziness, or leg swelling  GASTROINTESTINAL: No abdominal or epigastric pain. No nausea, vomiting, or hematemesis; No diarrhea or constipation. No melena or hematochezia.  GENITOURINARY: No dysuria, frequency, hematuria, or incontinence  NEUROLOGICAL: No headaches, memory loss, loss of strength, numbness, or tremors  SKIN: No itching, burning, rashes, or lesions   LYMPH Nodes: No enlarged glands  ENDOCRINE: No heat or cold intolerance; No hair loss  MUSCULOSKELETAL: No joint pain or swelling; No muscle, back, or extremity pain  PSYCHIATRIC: No depression, anxiety, mood swings, or difficulty sleeping  HEME/LYMPH: No easy bruising, or bleeding gums  ALLERY AND IMMUNOLOGIC: No hives or eczema	    [ x] All others negative	  [ ] Unable to obtain    PHYSICAL EXAM:  T(C): 37 (09-25-19 @ 08:00), Max: 37.5 (09-24-19 @ 23:00)  HR: 75 (09-25-19 @ 10:00) (68 - 82)  BP: 139/80 (09-25-19 @ 10:00) (110/70 - 158/100)  RR: 20 (09-25-19 @ 10:00) (18 - 30)  SpO2: 95% (09-25-19 @ 10:00) (90% - 95%)  Wt(kg): --  I&O's Summary    24 Sep 2019 07:01  -  25 Sep 2019 07:00  --------------------------------------------------------  IN: 994.5 mL / OUT: 2125 mL / NET: -1130.5 mL    25 Sep 2019 07:01  -  25 Sep 2019 10:12  --------------------------------------------------------  IN: 27 mL / OUT: 0 mL / NET: 27 mL        Appearance: Normal	  HEENT:   Normal oral mucosa, PERRL, EOMI	  Carotid:  Right: No bruit    Left:  No bruit  Lymphatic: No lymphadenopathy  Cardiovascular: Normal S1 S2, No JVD, No murmurs, No LE edema  Respiratory: Lungs clear to auscultation; patient on 4L NC with SpO2 93-94%, SpO2 drops to 89% on 1L NC  Psychiatry: A & O x 3, Mood & affect appropriate  Gastrointestinal:  Soft, Non-tender, + BS	  Skin: No rashes, No ecchymoses, No cyanosis	  Neurologic: Non-focal  Extremities: Normal range of motion, No clubbing, cyanosis.    LABS:	 	    CBC Full  -  ( 24 Sep 2019 23:41 )  WBC Count : 17.1 K/uL  Hemoglobin : 13.1 g/dL  Hematocrit : 41.1 %  Platelet Count - Automated : 223 K/uL  Mean Cell Volume : 92.8 fl  Mean Cell Hemoglobin : 29.6 pg  Mean Cell Hemoglobin Concentration : 32.0 gm/dL  Auto Neutrophil # : x  Auto Lymphocyte # : x  Auto Monocyte # : x  Auto Eosinophil # : x  Auto Basophil # : x  Auto Neutrophil % : x  Auto Lymphocyte % : x  Auto Monocyte % : x  Auto Eosinophil % : x  Auto Basophil % : x    09-24    138  |  97  |  16  ----------------------------<  166<H>  4.0   |  28  |  1.07  09-24    137  |  101  |  18  ----------------------------<  149<H>  4.4   |  24  |  0.99    Ca    9.3      24 Sep 2019 23:41  Ca    9.1      24 Sep 2019 06:09  Phos  3.1     09-24  Phos  2.9     09-24  Mg     2.1     09-24  Mg     1.7     09-24    TPro  7.7  /  Alb  3.9  /  TBili  0.6  /  DBili  x   /  AST  150<H>  /  ALT  115<H>  /  AlkPhos  120  09-24  TPro  7.9  /  Alb  3.7  /  TBili  0.6  /  DBili  x   /  AST  111<H>  /  ALT  92<H>  /  AlkPhos  98  09-23    PTT 95.1    HS trop 121 -> 993  ProBNP 662    CTA Chest 9/24/19  < from: CT Angio Chest w/ IV Cont (09.24.19 @ 00:26) >  IMPRESSION:     Pulmonary emboli in the bilateral main pulmonary arteries extending into   all lobar branches as well as several segmental and subsegmental   pulmonary arteries. Mild increase in the right ventricular to left   ventricular ratio concerning for right heart strain. Echocardiogram can   be performed for further evaluation as clinically indicated    < end of copied text >    Bilateral LE duplex US  < from: VA Duplex Lower Ext Vein Scan, Bilat (09.24.19 @ 14:06) >  IMPRESSION:     There is isolated occlusive thrombus of the left posterior tibial   peroneal trunk.    < end of copied text >    Echo 9/24/19  < from: TTE with Doppler (w/Cont) (09.24.19 @ 11:36) >  Conclusions:  1. Normal mitral valve. Mitral valve chordal systolic  anterior motion. Minimal mitral regurgitation.  2. Normal trileaflet aortic valve. No aortic valve  regurgitationseen.  3. Septal hypertrophy, the septum measures 1.8cm with mild  concentric left ventricular hypertrophy, consistent with  hypertrophic cardiomyopathy. Consider cardiac MRI for  further evaluation if clinically indicated.  4. Endocardial visualization enhanced with intravenous  injection of Ultrasonic Enhancing Agent (Definity). Normal  left ventricular systolic function. No segmental wall  motion abnormalities. Flattening of the interventricular  septum in both systole and diastole is  consistent with  right ventricular pressure overload.  5. Mild diastolic dysfunction (Stage I).  6. Mild ight ventricular systolic dysfunction with  hyperdynamic apical wall motion is seen consistent with  Bates's sign.  7. Normal pericardium with no pericardial effusion.  *** No previous Echo exam.    < end of copied text >        Assessment:  73 y/o F with PMH of HTN, HLD, T2DM, ovarian cancer s/p BSO-BRET and chemo in 2007, and gout who presented to [facility] with acute onset SOB and was transferred to [facility] for acute PE with RV strain. Vascular cardiology consulted for further management.     1. Acute bilateral PE - history of ovarian cancer, also recently bed bound for gout flare   - Submassive with elevated HS troponin and proBNP  - Hypertensive  2. L DVT of posterior tibial peroneal trunk  3. History of ovarian cancer  4. Hypertensive urgency  5. Septal hypertrophy concerning for hypertrophic cardiomyopathy    Plan:  1. Continue heparin gtt, no advanced therapies planned at this time  2. Check CT A/P with PO and IV contrast today given her history of ovarian cancer  3. Continue nasal cannula, goal SpO2 >90%  4. Okay for patient to get out of bed to chair and to ambulate with assistance today    Thank you,    ALAN Treadwell MD  Cardiology Fellow  [phone]  Vascular Cardiology Service

## 2019-09-25 NOTE — CHART NOTE - NSCHARTNOTEFT_GEN_A_CORE
====================  CCU MIDNIGHT ROUNDS  ====================    ANGIE JARVIS  [id number]    ====================  SUMMARY:  ====================        ====================  NEW EVENTS:  ====================        ====================  VITALS (Last 12 hrs):  ====================    T(C): 37.3 (09-25-19 @ 23:00), Max: 37.3 (09-25-19 @ 23:00)  HR: 73 (09-25-19 @ 23:00) (73 - 87)  BP: 119/64 (09-25-19 @ 23:00) (98/63 - 174/92)  BP(mean): 84 (09-25-19 @ 23:00) (74 - 126)  ABP: --  ABP(mean): --  RR: 29 (09-25-19 @ 23:00) (16 - 32)  SpO2: 94% (09-25-19 @ 23:00) (91% - 94%)  Wt(kg): --  CVP(mm Hg): --  CO: --  CI: --  PA: --  PA(mean): --  PA(direct): --  PCWP: --  SVR: --    TELEMETRY:        *BLOOD GAS/ARTERIAL/MIXED/VENOUS  *LACTATE    I&O's Summary    24 Sep 2019 07:01  -  25 Sep 2019 07:00  --------------------------------------------------------  IN: 994.5 mL / OUT: 2125 mL / NET: -1130.5 mL    25 Sep 2019 07:01  -  25 Sep 2019 23:51  --------------------------------------------------------  IN: 1486 mL / OUT: 450 mL / NET: 1036 mL        ====================  PLAN:  ====================    Jazmyne SCOTT-BC/CCU  spectra #[id number]  beeper #[id number]

## 2019-09-25 NOTE — PROGRESS NOTE ADULT - SUBJECTIVE AND OBJECTIVE BOX
Admission date:  CHIEF COMPLAINT:  HPI:  This is a 74 year old  female with PMH of HTN, HLD, DM2, Ovarian CA s/p BRET who was transferred from [facility] CCU for Saddle PE w/ RV strain.  Patient states she was home yesterday afternoon when she experienced acute shortness of breath accompanied by midsternal chest pain and diaphoresis.  Chest pain described as "midsternal 4/10 dull non-radiating continuous pain" and increases with inspiration.  EMS called who brought patient to [facility] ER.  S/P Chest CTA revealing saddle PE extending into all lobar branches as well as several segmental/subsegmental branches with RV strain.  Patient started on a Heparin gtt and Bipap for acute respiratory distress.  She was than transferred to [facility] CCU2 for continuum of care.  Upon transfer, patient was noted to have HTN emergency with /100 for which she was started on a Cardene gtt.  Currently patient remains off Bipap, on 4L NC with Sp02 92-95%.  Still states +SOB, denies chest pain, palpitations, abd pain, n/v/d/c.  Denies any personal or family history of PE or DVT in the past.  Full code. (24 Sep 2019 05:55)    INTERVAL HISTORY: Patient seen and examined, denies CP, dyspnea, orthopnea, PND, palpitations and able to lay flat. NARD noted.    REVIEW OF SYSTEMS:    CONSTITUTIONAL: No weakness, fevers or chills  EYES/ENT: No visual changes;  No vertigo or throat pain   NECK: No pain or stiffness  RESPIRATORY: No cough, wheezing, hemoptysis; No shortness of breath  CARDIOVASCULAR: No chest pain or palpitations  GASTROINTESTINAL: No abdominal or epigastric pain. No nausea, vomiting, or hematemesis; No diarrhea or constipation. No melena or hematochezia.  GENITOURINARY: No dysuria, frequency or hematuria  NEUROLOGICAL: No numbness or weakness  SKIN: No itching, rashes      MEDICATIONS  (STANDING):  atorvastatin 20 milliGRAM(s) Oral at bedtime  chlorhexidine 2% Cloths 1 Application(s) Topical <User Schedule>  dextrose 5%. 1000 milliLiter(s) (50 mL/Hr) IV Continuous <Continuous>  dextrose 50% Injectable 12.5 Gram(s) IV Push once  dextrose 50% Injectable 25 Gram(s) IV Push once  dextrose 50% Injectable 25 Gram(s) IV Push once  heparin  Infusion. 1200 Unit(s)/Hr (12 mL/Hr) IV Continuous <Continuous>  insulin lispro (HumaLOG) corrective regimen sliding scale   SubCutaneous three times a day before meals  insulin lispro (HumaLOG) corrective regimen sliding scale   SubCutaneous at bedtime  NIFEdipine XL 90 milliGRAM(s) Oral daily  pantoprazole    Tablet 40 milliGRAM(s) Oral before breakfast  triamterene 37.5 mG/hydrochlorothiazide 25 mG Capsule 1 Capsule(s) Oral daily    MEDICATIONS  (PRN):  dextrose 40% Gel 15 Gram(s) Oral once PRN Blood Glucose LESS THAN 70 milliGRAM(s)/deciliter  glucagon  Injectable 1 milliGRAM(s) IntraMuscular once PRN Glucose LESS THAN 70 milligrams/deciliter  heparin  Injectable 6500 Unit(s) IV Push every 6 hours PRN For aPTT less than 40  heparin  Injectable 3000 Unit(s) IV Push every 6 hours PRN For aPTT between 40 - 57      Objective:  ICU Vital Signs Last 24 Hrs  T(C): 37.1 (25 Sep 2019 05:00), Max: 37.5 (24 Sep 2019 23:00)  T(F): 98.7 (25 Sep 2019 05:00), Max: 99.5 (24 Sep 2019 23:00)  HR: 70 (25 Sep 2019 06:13) (68 - 81)  BP: 147/82 (25 Sep 2019 06:00) (110/70 - 158/100)  BP(mean): 109 (25 Sep 2019 06:00) (83 - 123)  ABP: --  ABP(mean): --  RR: 20 (25 Sep 2019 06:00) (18 - 30)  SpO2: 95% (25 Sep 2019 06:13) (90% - 96%)       @ 07:01  -   @ 07:00  --------------------------------------------------------  IN: 994.5 mL / OUT: 2125 mL / NET: -1130.5 mL      Daily Weight in k.9 (25 Sep 2019 05:00)    PHYSICAL EXAM:    General: WN/WD NAD  Neurology: Awake, nonfocal, VILLA x 4  Eyes: Scleras clear, PERRLA/ EOMI, Gross vision intact  ENT:Gross hearing intact, grossly patent pharynx, no stridor  Neck: Neck supple, trachea midline, No JVD,   Respiratory: CTA B/L, No wheezing, rales, rhonchi  CV: RRR, S1S2, no murmurs, rubs or gallops  Abdominal: Soft, NT, ND +BS,   Extremities: + edema, + peripheral pulses  Skin: No Rashes, Hematoma, Ecchymosis  Lymphatic: No Neck, axilla, groin LAD  Psych: Oriented x 3, normal affect      TELEMETRY: SR 70s    EKG:   < from: 12 Lead ECG (14 @ 18:30) >    Ventricular Rate 85 BPM    Atrial Rate 85 BPM    P-R Interval 162 ms    QRS Duration 86 ms     ms    QTc 487 ms    P Axis 36 degrees    R Axis -6 degrees    T Axis 72 degrees    Diagnosis Line Normal sinus rhythm  Left ventricular hypertrophy  Nonspecific T wave abnormality    < end of copied text >    IMAGING:  < from: TTE with Doppler (w/Cont) (19 @ 11:36) >  Conclusions: EF 65 - 70%  1. Normal mitral valve. Mitral valve chordal systolic  anterior motion. Minimal mitral regurgitation.  2. Normal trileaflet aortic valve. No aortic valve  regurgitationseen.  3. Septal hypertrophy, the septum measures 1.8cm with mild  concentric left ventricular hypertrophy, consistent with  hypertrophic cardiomyopathy. Consider cardiac MRI for  further evaluation if clinically indicated.  4. Endocardial visualization enhanced with intravenous  injection of Ultrasonic Enhancing Agent (Definity). Normal  left ventricular systolic function. No segmental wall  motion abnormalities. Flattening of the interventricular  septum in both systole and diastole is  consistent with  right ventricular pressure overload.  5. Mild diastolic dysfunction (Stage I).  6. Mild ight ventricular systolic dysfunction with  hyperdynamic apical wall motion is seen consistent with  Bates's sign.  7. Normal pericardium with no pericardial effusion.  *** No previous Echo exam.    < end of copied text >    Labs:  ABG - ( 24 Sep 2019 06:06 )  pH, Arterial: 7.45  pH, Blood: x     /  pCO2: 40    /  pO2: 65    / HCO3: 27    / Base Excess: 3.7   /  SaO2: 93                                      13.1   17.1  )-----------( 223      ( 24 Sep 2019 23:41 )             41.1         138  |  97  |  16  ----------------------------<  166<H>  4.0   |  28  |  1.07    Ca    9.3      24 Sep 2019 23:41  Phos  3.1       Mg     2.1         TPro  7.7  /  Alb  3.9  /  TBili  0.6  /  DBili  x   /  AST  150<H>  /  ALT  115<H>  /  AlkPhos  120      LIVER FUNCTIONS - ( 24 Sep 2019 23:41 )  Alb: 3.9 g/dL / Pro: 7.7 g/dL / ALK PHOS: 120 U/L / ALT: 115 U/L / AST: 150 U/L / GGT: x           PT/INR - ( 24 Sep 2019 23:43 )   PT: 13.5 sec;   INR: 1.17 ratio         PTT - ( 24 Sep 2019 23:43 )  PTT:95.1 sec        HEALTH ISSUES - PROBLEM Dx:  Suspected deep vein thrombosis (DVT)  Pulmonary thromboembolism  Prophylactic measure: Prophylactic measure  Gastroesophageal reflux disease without esophagitis: Gastroesophageal reflux disease without esophagitis  Type 2 diabetes mellitus without complication, without long-term current use of insulin: Type 2 diabetes mellitus without complication, without long-term current use of insulin  Hypertension, unspecified type: Hypertension, unspecified type  Acute saddle pulmonary embolism with acute cor pulmonale: Acute saddle pulmonary embolism with acute cor pulmonale

## 2019-09-26 DIAGNOSIS — N17.9 ACUTE KIDNEY FAILURE, UNSPECIFIED: ICD-10-CM

## 2019-09-26 DIAGNOSIS — I42.2 OTHER HYPERTROPHIC CARDIOMYOPATHY: ICD-10-CM

## 2019-09-26 LAB
ALBUMIN SERPL ELPH-MCNC: 3.6 G/DL — SIGNIFICANT CHANGE UP (ref 3.3–5)
ALP SERPL-CCNC: 117 U/L — SIGNIFICANT CHANGE UP (ref 40–120)
ALT FLD-CCNC: 70 U/L — HIGH (ref 10–45)
ANION GAP SERPL CALC-SCNC: 14 MMOL/L — SIGNIFICANT CHANGE UP (ref 5–17)
APTT BLD: 68.3 SEC — HIGH (ref 27.5–36.3)
AST SERPL-CCNC: 61 U/L — HIGH (ref 10–40)
BILIRUB SERPL-MCNC: 0.6 MG/DL — SIGNIFICANT CHANGE UP (ref 0.2–1.2)
BUN SERPL-MCNC: 23 MG/DL — SIGNIFICANT CHANGE UP (ref 7–23)
CALCIUM SERPL-MCNC: 9.1 MG/DL — SIGNIFICANT CHANGE UP (ref 8.4–10.5)
CHLORIDE SERPL-SCNC: 101 MMOL/L — SIGNIFICANT CHANGE UP (ref 96–108)
CO2 SERPL-SCNC: 25 MMOL/L — SIGNIFICANT CHANGE UP (ref 22–31)
CREAT SERPL-MCNC: 1.35 MG/DL — HIGH (ref 0.5–1.3)
GLUCOSE BLDC GLUCOMTR-MCNC: 114 MG/DL — HIGH (ref 70–99)
GLUCOSE BLDC GLUCOMTR-MCNC: 140 MG/DL — HIGH (ref 70–99)
GLUCOSE BLDC GLUCOMTR-MCNC: 152 MG/DL — HIGH (ref 70–99)
GLUCOSE BLDC GLUCOMTR-MCNC: 159 MG/DL — HIGH (ref 70–99)
GLUCOSE SERPL-MCNC: 167 MG/DL — HIGH (ref 70–99)
HCT VFR BLD CALC: 41.5 % — SIGNIFICANT CHANGE UP (ref 34.5–45)
HGB BLD-MCNC: 13.2 G/DL — SIGNIFICANT CHANGE UP (ref 11.5–15.5)
INR BLD: 1.19 RATIO — HIGH (ref 0.88–1.16)
MAGNESIUM SERPL-MCNC: 1.9 MG/DL — SIGNIFICANT CHANGE UP (ref 1.6–2.6)
MCHC RBC-ENTMCNC: 29.5 PG — SIGNIFICANT CHANGE UP (ref 27–34)
MCHC RBC-ENTMCNC: 31.8 GM/DL — LOW (ref 32–36)
MCV RBC AUTO: 92.9 FL — SIGNIFICANT CHANGE UP (ref 80–100)
PHOSPHATE SERPL-MCNC: 4.2 MG/DL — SIGNIFICANT CHANGE UP (ref 2.5–4.5)
PLATELET # BLD AUTO: 230 K/UL — SIGNIFICANT CHANGE UP (ref 150–400)
POTASSIUM SERPL-MCNC: 4.4 MMOL/L — SIGNIFICANT CHANGE UP (ref 3.5–5.3)
POTASSIUM SERPL-SCNC: 4.4 MMOL/L — SIGNIFICANT CHANGE UP (ref 3.5–5.3)
PROT SERPL-MCNC: 7.2 G/DL — SIGNIFICANT CHANGE UP (ref 6–8.3)
PROTHROM AB SERPL-ACNC: 13.7 SEC — HIGH (ref 10–12.9)
RBC # BLD: 4.46 M/UL — SIGNIFICANT CHANGE UP (ref 3.8–5.2)
RBC # FLD: 13 % — SIGNIFICANT CHANGE UP (ref 10.3–14.5)
SODIUM SERPL-SCNC: 140 MMOL/L — SIGNIFICANT CHANGE UP (ref 135–145)
WBC # BLD: 14.8 K/UL — HIGH (ref 3.8–10.5)
WBC # FLD AUTO: 14.8 K/UL — HIGH (ref 3.8–10.5)

## 2019-09-26 PROCEDURE — 99291 CRITICAL CARE FIRST HOUR: CPT

## 2019-09-26 PROCEDURE — 93010 ELECTROCARDIOGRAM REPORT: CPT

## 2019-09-26 RX ORDER — ALBUTEROL 90 UG/1
2.5 AEROSOL, METERED ORAL ONCE
Refills: 0 | Status: COMPLETED | OUTPATIENT
Start: 2019-09-26 | End: 2019-09-26

## 2019-09-26 RX ORDER — MAGNESIUM SULFATE 500 MG/ML
1 VIAL (ML) INJECTION ONCE
Refills: 0 | Status: COMPLETED | OUTPATIENT
Start: 2019-09-26 | End: 2019-09-26

## 2019-09-26 RX ORDER — RIVAROXABAN 15 MG-20MG
15 KIT ORAL
Refills: 0 | Status: DISCONTINUED | OUTPATIENT
Start: 2019-09-26 | End: 2019-09-27

## 2019-09-26 RX ORDER — SODIUM CHLORIDE 9 MG/ML
1000 INJECTION INTRAMUSCULAR; INTRAVENOUS; SUBCUTANEOUS
Refills: 0 | Status: DISCONTINUED | OUTPATIENT
Start: 2019-09-26 | End: 2019-09-27

## 2019-09-26 RX ADMIN — ATORVASTATIN CALCIUM 20 MILLIGRAM(S): 80 TABLET, FILM COATED ORAL at 22:16

## 2019-09-26 RX ADMIN — Medication 90 MILLIGRAM(S): at 08:58

## 2019-09-26 RX ADMIN — HEPARIN SODIUM 600 UNIT(S)/HR: 5000 INJECTION INTRAVENOUS; SUBCUTANEOUS at 02:52

## 2019-09-26 RX ADMIN — ALBUTEROL 2.5 MILLIGRAM(S): 90 AEROSOL, METERED ORAL at 11:00

## 2019-09-26 RX ADMIN — Medication 100 GRAM(S): at 04:28

## 2019-09-26 RX ADMIN — Medication 1: at 17:07

## 2019-09-26 RX ADMIN — RIVAROXABAN 15 MILLIGRAM(S): KIT at 22:16

## 2019-09-26 RX ADMIN — PANTOPRAZOLE SODIUM 40 MILLIGRAM(S): 20 TABLET, DELAYED RELEASE ORAL at 05:43

## 2019-09-26 RX ADMIN — SODIUM CHLORIDE 100 MILLILITER(S): 9 INJECTION INTRAMUSCULAR; INTRAVENOUS; SUBCUTANEOUS at 10:53

## 2019-09-26 RX ADMIN — Medication 1: at 12:14

## 2019-09-26 RX ADMIN — RIVAROXABAN 15 MILLIGRAM(S): KIT at 09:35

## 2019-09-26 NOTE — DIETITIAN INITIAL EVALUATION ADULT. - WEIGHT IN KG
Spoke with Charlee requesting refill script for Christina for One Touch Ultra test strips to [facility]. States she checks her blood sugar 3 times weekly and PRN.  Reviewed medication patient is not on any diabetic medication.  On 11/16/17 progress note from Dr Burrows shows under diabetes:  Current diabetic treatment includes diet. Her weight is stable. Her breakfast blood glucose range is generally  mg/dl.   NOV: 5/30/18  Script sent through     82.1 50 81.6

## 2019-09-26 NOTE — PROGRESS NOTE ADULT - SUBJECTIVE AND OBJECTIVE BOX
Patient is a 74y old  Female who presents with a chief complaint of PE (25 Sep 2019 10:12)    HPI:  This is a 74 year old  female with PMH of HTN, HLD, DM2, Ovarian CA s/p BRET who was transferred from [facility] CCU for Saddle PE w/ RV strain.  Patient states she was home yesterday afternoon when she experienced acute shortness of breath accompanied by midsternal chest pain and diaphoresis.  Chest pain described as "midsternal 4/10 dull non-radiating continuous pain" and increases with inspiration.  EMS called who brought patient to [facility] ER.  S/P Chest CTA revealing saddle PE extending into all lobar branches as well as several segmental/subsegmental branches with RV strain.  Patient started on a Heparin gtt and Bipap for acute respiratory distress.  She was than transferred to [facility] CCU2 for continuum of care.  Upon transfer, patient was noted to have HTN emergency with /100 for which she was started on a Cardene gtt.  Currently patient remains off Bipap, on 4L NC with Sp02 92-95%.  Still states +SOB, denies chest pain, palpitations, abd pain, n/v/d/c.  Denies any personal or family history of PE or DVT in the past.  Full code. (24 Sep 2019 05:55)    Overnight Event:    REVIEW OF SYSTEMS:  	    MEDICATIONS  (STANDING):  atorvastatin 20 milliGRAM(s) Oral at bedtime  chlorhexidine 2% Cloths 1 Application(s) Topical <User Schedule>  dextrose 5%. 1000 milliLiter(s) (50 mL/Hr) IV Continuous <Continuous>  dextrose 50% Injectable 12.5 Gram(s) IV Push once  dextrose 50% Injectable 25 Gram(s) IV Push once  dextrose 50% Injectable 25 Gram(s) IV Push once  heparin  Infusion. 1200 Unit(s)/Hr (12 mL/Hr) IV Continuous <Continuous>  insulin lispro (HumaLOG) corrective regimen sliding scale   SubCutaneous three times a day before meals  insulin lispro (HumaLOG) corrective regimen sliding scale   SubCutaneous at bedtime  NIFEdipine XL 90 milliGRAM(s) Oral daily  pantoprazole    Tablet 40 milliGRAM(s) Oral before breakfast    MEDICATIONS  (PRN):  dextrose 40% Gel 15 Gram(s) Oral once PRN Blood Glucose LESS THAN 70 milliGRAM(s)/deciliter  glucagon  Injectable 1 milliGRAM(s) IntraMuscular once PRN Glucose LESS THAN 70 milligrams/deciliter  heparin  Injectable 6500 Unit(s) IV Push every 6 hours PRN For aPTT less than 40  heparin  Injectable 3000 Unit(s) IV Push every 6 hours PRN For aPTT between 40 - 57        PHYSICAL EXAM:  Vital Signs Last 24 Hrs  T(C): 37.3 (26 Sep 2019 07:00), Max: 37.3 (25 Sep 2019 23:00)  T(F): 99.1 (26 Sep 2019 07:00), Max: 99.1 (25 Sep 2019 23:00)  HR: 71 (26 Sep 2019 07:00) (67 - 87)  BP: 136/75 (26 Sep 2019 07:00) (98/63 - 174/92)  BP(mean): 98 (26 Sep 2019 07:00) (74 - 126)  RR: 24 (26 Sep 2019 07:00) (15 - 32)  SpO2: 94% (26 Sep 2019 07:00) (91% - 96%)  I&O's Summary    25 Sep 2019 07:01  -  26 Sep 2019 07:00  --------------------------------------------------------  IN: 1634 mL / OUT: 450 mL / NET: 1184 mL        Appearance: Normal	  HEENT:   Normal oral mucosa, PERRL, EOMI	  Lymphatic: No lymphadenopathy  Cardiovascular: Normal S1 S2, No JVD, No murmurs, No edema  Respiratory: Lungs clear to auscultation	  Psychiatry: A & O x 3, Mood & affect appropriate  Gastrointestinal:  Soft, Non-tender, + BS	  Skin: No rashes, No ecchymoses, No cyanosis	  Neurologic: Non-focal  Extremities: Normal range of motion, No clubbing, cyanosis or edema  Vascular: Peripheral pulses palpable 2+ bilaterally    LABS:	 	                        13.2   14.8  )-----------( 230      ( 26 Sep 2019 02:22 )             41.5     Auto Eosinophil # x     / Auto Eosinophil % x     / Auto Neutrophil # x     / Auto Neutrophil % x     / BANDS % x                            12.9   17.6  )-----------( 234      ( 25 Sep 2019 11:16 )             41.5     Auto Eosinophil # 0.4   / Auto Eosinophil % 2.3   / Auto Neutrophil # 8.0   / Auto Neutrophil % 45.3  / BANDS % x                            13.1   17.1  )-----------( 223      ( 24 Sep 2019 23:41 )             41.1     Auto Eosinophil # x     / Auto Eosinophil % x     / Auto Neutrophil # x     / Auto Neutrophil % x     / BANDS % x        INR: 1.19 ratio (09-26 @ 02:26)  INR: 1.21 ratio (09-25 @ 11:16)  INR: 1.17 ratio (09-24 @ 23:43)    09-26    140  |  101  |  23  ----------------------------<  167<H>  4.4   |  25  |  1.35<H>  09-25    137  |  98  |  14  ----------------------------<  118<H>  4.2   |  28  |  1.01  09-24    138  |  97  |  16  ----------------------------<  166<H>  4.0   |  28  |  1.07    Ca    9.1      26 Sep 2019 02:22  Mg     1.9     09-26  Phos  4.2     09-26  TPro  7.2  /  Alb  3.6  /  TBili  0.6  /  DBili  x   /  AST  61<H>  /  ALT  70<H>  /  AlkPhos  117  09-26  TPro  7.7  /  Alb  3.9  /  TBili  0.6  /  DBili  x   /  AST  150<H>  /  ALT  115<H>  /  AlkPhos  120  09-24    Lactate, Blood: 1.4 mmol/L (09-24 @ 06:09)      proBNP: Serum Pro-Brain Natriuretic Peptide: 662.2 pg/mL (09-23 @ 19:17)    Lipid Profile: 09-24 Chol 178 <H> HDL 38<L> Trig 96  HgA1c: 6.9 % (09-24 @ 09:11)    TSH: Thyroid Stimulating Hormone, Serum: 1.14 uIU/mL (09-24 @ 10:13)      CARDIAC MARKERS:        TELEMETRY: 	    ECG:  	  RADIOLOGY:  OTHER: 	    PREVIOUS DIAGNOSTIC TESTING:    [ ] Echocardiogram: < from: TTE with Doppler (w/Cont) (09.24.19 @ 11:36) >  Conclusions: EF 65-70%  1. Normal mitral valve. Mitral valve chordal systolic  anterior motion. Minimal mitral regurgitation.  2. Normal trileaflet aortic valve. No aortic valve  regurgitationseen.  3. Septal hypertrophy, the septum measures 1.8cm with mild  concentric left ventricular hypertrophy, consistent with  hypertrophic cardiomyopathy. Consider cardiac MRI for  further evaluation if clinically indicated.  4. Endocardial visualization enhanced with intravenous  injection of Ultrasonic Enhancing Agent (Definity). Normal  left ventricular systolic function. No segmental wall  motion abnormalities. Flattening of the interventricular  septum in both systole and diastole is  consistent with  right ventricular pressure overload.  5. Mild diastolic dysfunction (Stage I).  6. Mild ight ventricular systolic dysfunction with  hyperdynamic apical wall motion is seen consistent with  Bates's sign.  7. Normal pericardium with no pericardial effusion.  *** No previous Echo exam.    < end of copied text >      	  GARTH CooleyB[facility] HospitalMARTIN  Contact #	[phone] Patient is a 74y old  Female who presents with a chief complaint of PE (25 Sep 2019 10:12)    HPI:  This is a 74 year old  female with PMH of HTN, HLD, DM2, Ovarian CA s/p BRET who was transferred from [facility] CCU for Saddle PE w/ RV strain.  Patient states she was home yesterday afternoon when she experienced acute shortness of breath accompanied by midsternal chest pain and diaphoresis.  Chest pain described as "midsternal 4/10 dull non-radiating continuous pain" and increases with inspiration.  EMS called who brought patient to [facility] ER.  S/P Chest CTA revealing saddle PE extending into all lobar branches as well as several segmental/subsegmental branches with RV strain.  Patient started on a Heparin gtt and Bipap for acute respiratory distress.  She was than transferred to [facility] CCU2 for continuum of care.  Upon transfer, patient was noted to have HTN emergency with /100 for which she was started on a Cardene gtt.  Currently patient remains off Bipap, on 4L NC with Sp02 92-95%.  Still states +SOB, denies chest pain, palpitations, abd pain, n/v/d/c.  Denies any personal or family history of PE or DVT in the past.  Full code. (24 Sep 2019 05:55)    Overnight Event: No issues overnight    REVIEW OF SYSTEMS: Feels weak. No SOB or chest pain  	    MEDICATIONS  (STANDING):  atorvastatin 20 milliGRAM(s) Oral at bedtime  chlorhexidine 2% Cloths 1 Application(s) Topical <User Schedule>  dextrose 5%. 1000 milliLiter(s) (50 mL/Hr) IV Continuous <Continuous>  dextrose 50% Injectable 12.5 Gram(s) IV Push once  dextrose 50% Injectable 25 Gram(s) IV Push once  dextrose 50% Injectable 25 Gram(s) IV Push once  heparin  Infusion. 1200 Unit(s)/Hr (12 mL/Hr) IV Continuous <Continuous>  insulin lispro (HumaLOG) corrective regimen sliding scale   SubCutaneous three times a day before meals  insulin lispro (HumaLOG) corrective regimen sliding scale   SubCutaneous at bedtime  NIFEdipine XL 90 milliGRAM(s) Oral daily  pantoprazole    Tablet 40 milliGRAM(s) Oral before breakfast    MEDICATIONS  (PRN):  dextrose 40% Gel 15 Gram(s) Oral once PRN Blood Glucose LESS THAN 70 milliGRAM(s)/deciliter  glucagon  Injectable 1 milliGRAM(s) IntraMuscular once PRN Glucose LESS THAN 70 milligrams/deciliter  heparin  Injectable 6500 Unit(s) IV Push every 6 hours PRN For aPTT less than 40  heparin  Injectable 3000 Unit(s) IV Push every 6 hours PRN For aPTT between 40 - 57        PHYSICAL EXAM:  Vital Signs Last 24 Hrs  T(C): 37.3 (26 Sep 2019 07:00), Max: 37.3 (25 Sep 2019 23:00)  T(F): 99.1 (26 Sep 2019 07:00), Max: 99.1 (25 Sep 2019 23:00)  HR: 71 (26 Sep 2019 07:00) (67 - 87)  BP: 136/75 (26 Sep 2019 07:00) (98/63 - 174/92)  BP(mean): 98 (26 Sep 2019 07:00) (74 - 126)  RR: 24 (26 Sep 2019 07:00) (15 - 32)  SpO2: 94% (26 Sep 2019 07:00) (91% - 96%)  I&O's Summary    25 Sep 2019 07:01  -  26 Sep 2019 07:00  --------------------------------------------------------  IN: 1634 mL / OUT: 450 mL / NET: 1184 mL      Appearance: Normal	  HEENT:   Normal oral mucosa, PERRL, EOMI	  Cardiovascular: Normal S1 S2, No JVD, No murmurs, No edema  Respiratory: Lungs clear to auscultation	  Psychiatry: A & O x 3, Mood & affect appropriate  Gastrointestinal:  Soft, Non-tender, + BS	  Skin: No rashes, No ecchymoses, No cyanosis	  Neurologic: Non-focal  Extremities: Normal range of motion, No clubbing, cyanosis or edema  Vascular: Peripheral pulses palpable 2+ bilaterally    LABS:	 	                        13.2   14.8  )-----------( 230      ( 26 Sep 2019 02:22 )             41.5     Auto Eosinophil # x     / Auto Eosinophil % x     / Auto Neutrophil # x     / Auto Neutrophil % x     / BANDS % x                            12.9   17.6  )-----------( 234      ( 25 Sep 2019 11:16 )             41.5     Auto Eosinophil # 0.4   / Auto Eosinophil % 2.3   / Auto Neutrophil # 8.0   / Auto Neutrophil % 45.3  / BANDS % x                            13.1   17.1  )-----------( 223      ( 24 Sep 2019 23:41 )             41.1     Auto Eosinophil # x     / Auto Eosinophil % x     / Auto Neutrophil # x     / Auto Neutrophil % x     / BANDS % x        INR: 1.19 ratio (09-26 @ 02:26)  INR: 1.21 ratio (09-25 @ 11:16)  INR: 1.17 ratio (09-24 @ 23:43)    09-26    140  |  101  |  23  ----------------------------<  167<H>  4.4   |  25  |  1.35<H>  09-25    137  |  98  |  14  ----------------------------<  118<H>  4.2   |  28  |  1.01  09-24    138  |  97  |  16  ----------------------------<  166<H>  4.0   |  28  |  1.07    Ca    9.1      26 Sep 2019 02:22  Mg     1.9     09-26  Phos  4.2     09-26  TPro  7.2  /  Alb  3.6  /  TBili  0.6  /  DBili  x   /  AST  61<H>  /  ALT  70<H>  /  AlkPhos  117  09-26  TPro  7.7  /  Alb  3.9  /  TBili  0.6  /  DBili  x   /  AST  150<H>  /  ALT  115<H>  /  AlkPhos  120  09-24    Lactate, Blood: 1.4 mmol/L (09-24 @ 06:09)  proBNP: Serum Pro-Brain Natriuretic Peptide: 662.2 pg/mL (09-23 @ 19:17)  Lipid Profile: 09-24 Chol 178 <H> HDL 38<L> Trig 96  HgA1c: 6.9 % (09-24 @ 09:11)  TSH: Thyroid Stimulating Hormone, Serum: 1.14 uIU/mL (09-24 @ 10:13)      TELEMETRY: 	No ectopy    ECG:  	Normal Sinus Rhythm  RADIOLOGY:  < from: CT Abdomen and Pelvis w/ Oral Cont and w/ IV Cont (09.25.19 @ 14:00) >  IMPRESSION: No evidence of metastatic disease.     < end of copied text >       < from: CT Angio Chest w/ IV Cont (09.24.19 @ 00:26) >  IMPRESSION:     Pulmonary emboli in the bilateral main pulmonary arteries extending into   all lobar branches as well as several segmental and subsegmental   pulmonary arteries. Mild increase in the right ventricular to left   ventricular ratio concerning for right heart strain. Echocardiogram can   be performed for further evaluation as clinically indicated    < end of copied text >    OTHER: 	    PREVIOUS DIAGNOSTIC TESTING:    [ ] Echocardiogram: < from: TTE with Doppler (w/Cont) (09.24.19 @ 11:36) >  Conclusions: EF 65-70%  1. Normal mitral valve. Mitral valve chordal systolic  anterior motion. Minimal mitral regurgitation.  2. Normal trileaflet aortic valve. No aortic valve  regurgitation seen.  3. Septal hypertrophy, the septum measures 1.8cm with mild  concentric left ventricular hypertrophy, consistent with  hypertrophic cardiomyopathy. Consider cardiac MRI for  further evaluation if clinically indicated.  4. Endocardial visualization enhanced with intravenous  injection of Ultrasonic Enhancing Agent (Definity). Normal  left ventricular systolic function. No segmental wall  motion abnormalities. Flattening of the interventricular  septum in both systole and diastole is  consistent with  right ventricular pressure overload.  5. Mild diastolic dysfunction (Stage I).  6. Mild ight ventricular systolic dysfunction with  hyperdynamic apical wall motion is seen consistent with  Bates's sign.  7. Normal pericardium with no pericardial effusion.  *** No previous Echo exam.    < end of copied text >    < from: VA Duplex Lower Ext Vein Scan, Bilat (09.24.19 @ 14:06) >  IMPRESSION:     There is isolated occlusive thrombus of the left posterior tibial   peroneal trunk.    < end of copied text >    	  GARTH CooleyHealth[facility] ArizonaMARTIN  Contact # [phone]

## 2019-09-26 NOTE — DIETITIAN INITIAL EVALUATION ADULT. - PERTINENT MEDS FT
MEDICATIONS  (STANDING):  atorvastatin 20 milliGRAM(s) Oral at bedtime  chlorhexidine 2% Cloths 1 Application(s) Topical <User Schedule>  dextrose 5%. 1000 milliLiter(s) (50 mL/Hr) IV Continuous <Continuous>  dextrose 50% Injectable 12.5 Gram(s) IV Push once  dextrose 50% Injectable 25 Gram(s) IV Push once  dextrose 50% Injectable 25 Gram(s) IV Push once  heparin  Infusion. 1200 Unit(s)/Hr (12 mL/Hr) IV Continuous <Continuous>  insulin lispro (HumaLOG) corrective regimen sliding scale   SubCutaneous three times a day before meals  insulin lispro (HumaLOG) corrective regimen sliding scale   SubCutaneous at bedtime  NIFEdipine XL 90 milliGRAM(s) Oral daily  pantoprazole    Tablet 40 milliGRAM(s) Oral before breakfast    MEDICATIONS  (PRN):  dextrose 40% Gel 15 Gram(s) Oral once PRN Blood Glucose LESS THAN 70 milliGRAM(s)/deciliter  glucagon  Injectable 1 milliGRAM(s) IntraMuscular once PRN Glucose LESS THAN 70 milligrams/deciliter  heparin  Injectable 6500 Unit(s) IV Push every 6 hours PRN For aPTT less than 40  heparin  Injectable 3000 Unit(s) IV Push every 6 hours PRN For aPTT between 40 - 57

## 2019-09-26 NOTE — DIETITIAN INITIAL EVALUATION ADULT. - OTHER INFO
Pt seen for: 5Tower Length Of Stay   Adm dx: saddle PE    GI issues: no N/V  Last BM: 9/26    Food allergies/Intolerances: NKFA   Vit/supplement PTA: none consistently    Diet PTA: tries to limit Na and carbohydrate intake     Subjective/Objective information: pt reports good appetite PTA. Reports usual wt 180 lb, no changes over past year. Checks BG usually once a day, will check more frequently if hi, states range 129-160, last A1c 7.0, A1c this adm 6.9    Education: Heart Healthy Consistent Carbohydrate Nutrition Therapy handout provided and reviewed, wt loss encouraged. Pt seen for: 5Tower Length Of Stay   Adm dx: saddle PE    GI issues: no N/V  Last BM: 9/26    Food allergies/Intolerances: NKFA   Vit/supplement PTA: none consistently    Diet PTA: tries to limit Na and carbohydrate intake     Subjective/Objective information: pt reports good appetite PTA. Reports usual wt 180 lb, no changes over past year. Checks BG usually once a day, will check more frequently if hi, states range 129-160, last A1c 7.0, A1c this adm 6.9    Education: Heart Healthy Consistent Carbohydrate Nutrition Therapy handout provided and reviewed, wt loss encouraged. Will f/u on education needs.

## 2019-09-26 NOTE — PHYSICAL THERAPY INITIAL EVALUATION ADULT - PRECAUTIONS/LIMITATIONS, REHAB EVAL
EMS called who brought patient to [facility] ER.  S/P Chest CTA revealing saddle PE extending into all lobar branches as well as several segmental/subsegmental branches with RV strain. Patient started on a Heparin gtt and Bipap for acute respiratory distress.  She was than transferred to [facility] CCU2 for continuum of care.  Upon transfer, patient was noted to have HTN emergency with /100 for which she was started on a Cardene gtt.  Currently patient remains off Bipap, on 4L NC with Sp02 92-95%.  Still states +SOB, denies chest pain, palpitations, abd pain, n/v/d/c. EMS called who brought patient to [facility] ER.  S/P Chest CTA revealing saddle PE extending into all lobar branches as well as several segmental/subsegmental branches with RV strain. Patient started on a Heparin gtt and Bipap for acute respiratory distress.  She was than transferred to [facility] CCU2 for continuum of care.  Upon transfer, patient was noted to have HTN emergency with /100 for which she was started on a Cardene gtt.  Currently patient remains off Bipap, on 4L NC with Sp02 92-95%.  Still states +SOB, denies chest pain, palpitations, abd pain, n/v/d/c. VA Duplex: There is isolated occlusive thrombus of the left posterior tibial peroneal trunk. CTA chest: Pulmonary emboli in the bilateral main pulmonary arteries extending into all lobar branches as well as several segmental and subsegmental pulmonary arteries. Mild increase in the right ventricular to left ventricular ratio concerning for right heart strain.

## 2019-09-26 NOTE — PHYSICAL THERAPY INITIAL EVALUATION ADULT - PERTINENT HX OF CURRENT PROBLEM, REHAB EVAL
Pt is a 75 y/o female admitted to [facility] on 9/24/19  PMH of HTN, HLD, DM2, Ovarian CA s/p BRET who was transferred from [facility] CCU for Saddle PE w/ RV strain.  Patient states she was home yesterday afternoon when she experienced acute shortness of breath accompanied by midsternal chest pain and diaphoresis.  Chest pain described as "midsternal 4/10 dull non-radiating continuous pain" and increases with inspiration.

## 2019-09-26 NOTE — PHYSICAL THERAPY INITIAL EVALUATION ADULT - GAIT DEVIATIONS NOTED, PT EVAL
decreased kim/decreased step length/decreased velocity of limb motion/decreased weight-shifting ability

## 2019-09-26 NOTE — PHYSICAL THERAPY INITIAL EVALUATION ADULT - ADDITIONAL COMMENTS
Pt lives with tenzinr in a private house with one flight of steps inside. Pt reports upon discharge, she plans to stay on the main level of the home .

## 2019-09-26 NOTE — PROGRESS NOTE ADULT - SUBJECTIVE AND OBJECTIVE BOX
Vascular Cardiology  Progress note     [facility] [id number]              EMAIL [email or web address]   OFFICE [phone]    CC:  Shortness of breath    INTERVAL HISTORY:  She has no complaints. Denies SOB, cough, chest pain, leg swelling.          Allergies    No Known Allergies    Intolerances    	    MEDICATIONS:  NIFEdipine XL 90 milliGRAM(s) Oral daily  rivaroxaban 15 milliGRAM(s) Oral two times a day with meals  ALBUTerol    0.083%. 2.5 milliGRAM(s) Nebulizer once  pantoprazole    Tablet 40 milliGRAM(s) Oral before breakfast  atorvastatin 20 milliGRAM(s) Oral at bedtime  dextrose 40% Gel 15 Gram(s) Oral once PRN  dextrose 50% Injectable 12.5 Gram(s) IV Push once  dextrose 50% Injectable 25 Gram(s) IV Push once  dextrose 50% Injectable 25 Gram(s) IV Push once  glucagon  Injectable 1 milliGRAM(s) IntraMuscular once PRN  insulin lispro (HumaLOG) corrective regimen sliding scale   SubCutaneous three times a day before meals  insulin lispro (HumaLOG) corrective regimen sliding scale   SubCutaneous at bedtime  chlorhexidine 2% Cloths 1 Application(s) Topical <User Schedule>  dextrose 5%. 1000 milliLiter(s) IV Continuous <Continuous>  sodium chloride 0.9%. 1000 milliLiter(s) IV Continuous <Continuous>      PAST MEDICAL & SURGICAL HISTORY:  Ovarian ca  Cataract  Acid reflux  Hypertension  Hyperlipidemia  Gout  S/P cholecystectomy  S/P hysterectomy  S/P ovarian cystectomy      FAMILY HISTORY:  No history of PE/DVT    SOCIAL HISTORY:  unchanged    REVIEW OF SYSTEMS:  CONSTITUTIONAL: No fever, weight loss, or fatigue  EYES: No eye pain, visual disturbances, or discharge  ENMT:  No difficulty hearing, tinnitus, vertigo; No sinus or throat pain  NECK: No pain or stiffness  RESPIRATORY: No cough, wheezing, chills or hemoptysis; No Shortness of Breath  CARDIOVASCULAR: No chest pain, palpitations, passing out, dizziness, or leg swelling  GASTROINTESTINAL: No abdominal or epigastric pain. No nausea, vomiting, or hematemesis; No diarrhea or constipation. No melena or hematochezia.  GENITOURINARY: No dysuria, frequency, hematuria, or incontinence  NEUROLOGICAL: No headaches, memory loss, loss of strength, numbness, or tremors  SKIN: No itching, burning, rashes, or lesions   LYMPH Nodes: No enlarged glands  ENDOCRINE: No heat or cold intolerance; No hair loss  MUSCULOSKELETAL: No joint pain or swelling; No muscle, back, or extremity pain  PSYCHIATRIC: No depression, anxiety, mood swings, or difficulty sleeping  HEME/LYMPH: No easy bruising, or bleeding gums  ALLERY AND IMMUNOLOGIC: No hives or eczema	      [ x] All others negative	  [ ] Unable to obtain    PHYSICAL EXAM:  T(C): 37.3 (09-26-19 @ 07:00), Max: 37.3 (09-25-19 @ 23:00)  HR: 79 (09-26-19 @ 10:00) (67 - 87)  BP: 120/67 (09-26-19 @ 10:00) (98/63 - 174/92)  RR: 27 (09-26-19 @ 10:00) (15 - 32)  SpO2: 93% (09-26-19 @ 10:00) (91% - 96%)  Wt(kg): --  I&O's Summary    25 Sep 2019 07:01  -  26 Sep 2019 07:00  --------------------------------------------------------  IN: 1634 mL / OUT: 450 mL / NET: 1184 mL    26 Sep 2019 07:01  -  26 Sep 2019 10:54  --------------------------------------------------------  IN: 252 mL / OUT: 0 mL / NET: 252 mL      Appearance: Normal, sitting in chair  HEENT:   Normal oral mucosa, PERRL, EOMI	  Lymphatic: No lymphadenopathy  Cardiovascular: Normal S1 S2, No JVD, No murmurs, No LE edema  Respiratory: Lungs clear to auscultation; patient on 1L NC with SpO2 93-94%   Psychiatry: A & O x 3, Mood & affect appropriate  Gastrointestinal:  Soft, Non-tender, + BS	  Skin: No rashes, No ecchymoses, No cyanosis	  Neurologic: Non-focal  Extremities: Normal range of motion, No clubbing, cyanosis.      LABS:	 	    CBC Full  -  ( 26 Sep 2019 02:22 )  WBC Count : 14.8 K/uL  Hemoglobin : 13.2 g/dL  Hematocrit : 41.5 %  Platelet Count - Automated : 230 K/uL  Mean Cell Volume : 92.9 fl  Mean Cell Hemoglobin : 29.5 pg  Mean Cell Hemoglobin Concentration : 31.8 gm/dL  Auto Neutrophil # : x  Auto Lymphocyte # : x  Auto Monocyte # : x  Auto Eosinophil # : x  Auto Basophil # : x  Auto Neutrophil % : x  Auto Lymphocyte % : x  Auto Monocyte % : x  Auto Eosinophil % : x  Auto Basophil % : x    09-26    140  |  101  |  23  ----------------------------<  167<H>  4.4   |  25  |  1.35<H>  09-25    137  |  98  |  14  ----------------------------<  118<H>  4.2   |  28  |  1.01    Ca    9.1      26 Sep 2019 02:22  Ca    9.3      25 Sep 2019 11:16  Phos  4.2     09-26  Phos  3.4     09-25  Mg     1.9     09-26  Mg     1.9     09-25    TPro  7.2  /  Alb  3.6  /  TBili  0.6  /  DBili  x   /  AST  61<H>  /  ALT  70<H>  /  AlkPhos  117  09-26  TPro  7.7  /  Alb  3.9  /  TBili  0.6  /  DBili  x   /  AST  150<H>  /  ALT  115<H>  /  AlkPhos  120  09-24        CTA Chest 9/24/19  < from: CT Angio Chest w/ IV Cont (09.24.19 @ 00:26) >  IMPRESSION:     Pulmonary emboli in the bilateral main pulmonary arteries extending into   all lobar branches as well as several segmental and subsegmental   pulmonary arteries. Mild increase in the right ventricular to left   ventricular ratio concerning for right heart strain. Echocardiogram can   be performed for further evaluation as clinically indicated    < end of copied text >    Bilateral LE duplex US  < from: VA Duplex Lower Ext Vein Scan, Bilat (09.24.19 @ 14:06) >  IMPRESSION:     There is isolated occlusive thrombus of the left posterior tibial   peroneal trunk.    < end of copied text >    Echo 9/24/19  < from: TTE with Doppler (w/Cont) (09.24.19 @ 11:36) >  Conclusions:  1. Normal mitral valve. Mitral valve chordal systolic  anterior motion. Minimal mitral regurgitation.  2. Normal trileaflet aortic valve. No aortic valve  regurgitationseen.  3. Septal hypertrophy, the septum measures 1.8cm with mild  concentric left ventricular hypertrophy, consistent with  hypertrophic cardiomyopathy. Consider cardiac MRI for  further evaluation if clinically indicated.  4. Endocardial visualization enhanced with intravenous  injection of Ultrasonic Enhancing Agent (Definity). Normal  left ventricular systolic function. No segmental wall  motion abnormalities. Flattening of the interventricular  septum in both systole and diastole is  consistent with  right ventricular pressure overload.  5. Mild diastolic dysfunction (Stage I).  6. Mild ight ventricular systolic dysfunction with  hyperdynamic apical wall motion is seen consistent with  Bates's sign.  7. Normal pericardium with no pericardial effusion.  *** No previous Echo exam.    < end of copied text >      Assessment:  75 y/o F with PMH of HTN, HLD, T2DM, ovarian cancer s/p BSO-BRET and chemo in 2007, and gout who presented to [facility] with acute onset SOB and was transferred to [facility] for acute PE with RV strain. Vascular cardiology consulted for further management.     1. Acute bilateral PE - unprovoked  - Submassive with elevated HS troponin and proBNP  - Hypertensive  2. L DVT of posterior tibial peroneal trunk  3. History of ovarian cancer  4. Hypertensive urgency  5. Septal hypertrophy concerning for hypertrophic cardiomyopathy  6. Acute kidney injury    Plan:  1. Start Xarelto 15 mg BID this morning, discontinue heparin gtt at the time the first dose of Xarelto is given. Will give Xarelto 15 mg BID for 21 days followed by Xarelto 20 mg daily  2. Encourage PO intake and give IVF today  3. Continue nasal cannula, goal SpO2 >90%  4. Encourage patient to ambulate with assistance today    Thank you,    ALAN Treadwell MD  Cardiology Fellow  [phone]  Vascular Cardiology Service

## 2019-09-26 NOTE — DIETITIAN INITIAL EVALUATION ADULT. - ENERGY NEEDS
Ht: 62"  Wt: 181   BMI: 33.1 kg/m2   IBW: 110  (+/-10%)     165% IBW  Edema: none        Skin: no pressure injuries documented

## 2019-09-27 ENCOUNTER — TRANSCRIPTION ENCOUNTER (OUTPATIENT)
Age: 74
End: 2019-09-27

## 2019-09-27 VITALS — RESPIRATION RATE: 26 BRPM | OXYGEN SATURATION: 91 % | HEART RATE: 79 BPM

## 2019-09-27 LAB
ALBUMIN SERPL ELPH-MCNC: 3.4 G/DL — SIGNIFICANT CHANGE UP (ref 3.3–5)
ALP SERPL-CCNC: 96 U/L — SIGNIFICANT CHANGE UP (ref 40–120)
ALT FLD-CCNC: 44 U/L — SIGNIFICANT CHANGE UP (ref 10–45)
ANION GAP SERPL CALC-SCNC: 11 MMOL/L — SIGNIFICANT CHANGE UP (ref 5–17)
APTT BLD: 39.3 SEC — HIGH (ref 27.5–36.3)
AST SERPL-CCNC: 29 U/L — SIGNIFICANT CHANGE UP (ref 10–40)
BILIRUB SERPL-MCNC: 0.6 MG/DL — SIGNIFICANT CHANGE UP (ref 0.2–1.2)
BUN SERPL-MCNC: 24 MG/DL — HIGH (ref 7–23)
CALCIUM SERPL-MCNC: 9 MG/DL — SIGNIFICANT CHANGE UP (ref 8.4–10.5)
CHLORIDE SERPL-SCNC: 101 MMOL/L — SIGNIFICANT CHANGE UP (ref 96–108)
CO2 SERPL-SCNC: 25 MMOL/L — SIGNIFICANT CHANGE UP (ref 22–31)
CREAT SERPL-MCNC: 1.32 MG/DL — HIGH (ref 0.5–1.3)
GLUCOSE BLDC GLUCOMTR-MCNC: 121 MG/DL — HIGH (ref 70–99)
GLUCOSE BLDC GLUCOMTR-MCNC: 125 MG/DL — HIGH (ref 70–99)
GLUCOSE BLDC GLUCOMTR-MCNC: 129 MG/DL — HIGH (ref 70–99)
GLUCOSE SERPL-MCNC: 144 MG/DL — HIGH (ref 70–99)
HCT VFR BLD CALC: 39.5 % — SIGNIFICANT CHANGE UP (ref 34.5–45)
HGB BLD-MCNC: 12.3 G/DL — SIGNIFICANT CHANGE UP (ref 11.5–15.5)
INR BLD: 1.99 RATIO — HIGH (ref 0.88–1.16)
MAGNESIUM SERPL-MCNC: 2 MG/DL — SIGNIFICANT CHANGE UP (ref 1.6–2.6)
MCHC RBC-ENTMCNC: 29 PG — SIGNIFICANT CHANGE UP (ref 27–34)
MCHC RBC-ENTMCNC: 31.2 GM/DL — LOW (ref 32–36)
MCV RBC AUTO: 92.9 FL — SIGNIFICANT CHANGE UP (ref 80–100)
PHOSPHATE SERPL-MCNC: 4.3 MG/DL — SIGNIFICANT CHANGE UP (ref 2.5–4.5)
PLATELET # BLD AUTO: 229 K/UL — SIGNIFICANT CHANGE UP (ref 150–400)
POTASSIUM SERPL-MCNC: 3.9 MMOL/L — SIGNIFICANT CHANGE UP (ref 3.5–5.3)
POTASSIUM SERPL-SCNC: 3.9 MMOL/L — SIGNIFICANT CHANGE UP (ref 3.5–5.3)
PROT SERPL-MCNC: 6.9 G/DL — SIGNIFICANT CHANGE UP (ref 6–8.3)
PROTHROM AB SERPL-ACNC: 23.2 SEC — HIGH (ref 10–12.9)
RBC # BLD: 4.25 M/UL — SIGNIFICANT CHANGE UP (ref 3.8–5.2)
RBC # FLD: 13 % — SIGNIFICANT CHANGE UP (ref 10.3–14.5)
SODIUM SERPL-SCNC: 137 MMOL/L — SIGNIFICANT CHANGE UP (ref 135–145)
WBC # BLD: 15.5 K/UL — HIGH (ref 3.8–10.5)
WBC # FLD AUTO: 15.5 K/UL — HIGH (ref 3.8–10.5)

## 2019-09-27 PROCEDURE — 99239 HOSP IP/OBS DSCHRG MGMT >30: CPT

## 2019-09-27 PROCEDURE — 99232 SBSQ HOSP IP/OBS MODERATE 35: CPT

## 2019-09-27 RX ORDER — POTASSIUM CHLORIDE 20 MEQ
40 PACKET (EA) ORAL ONCE
Refills: 0 | Status: COMPLETED | OUTPATIENT
Start: 2019-09-27 | End: 2019-09-27

## 2019-09-27 RX ORDER — POTASSIUM CHLORIDE 20 MEQ
20 PACKET (EA) ORAL ONCE
Refills: 0 | Status: COMPLETED | OUTPATIENT
Start: 2019-09-27 | End: 2019-09-27

## 2019-09-27 RX ORDER — LISINOPRIL 2.5 MG/1
1 TABLET ORAL
Qty: 0 | Refills: 0 | DISCHARGE

## 2019-09-27 RX ORDER — SODIUM CHLORIDE 9 MG/ML
500 INJECTION INTRAMUSCULAR; INTRAVENOUS; SUBCUTANEOUS ONCE
Refills: 0 | Status: COMPLETED | OUTPATIENT
Start: 2019-09-27 | End: 2019-09-27

## 2019-09-27 RX ORDER — INDOMETHACIN 50 MG
1 CAPSULE ORAL
Qty: 0 | Refills: 0 | DISCHARGE

## 2019-09-27 RX ORDER — RIVAROXABAN 15 MG-20MG
1 KIT ORAL
Qty: 40 | Refills: 0
Start: 2019-09-27 | End: 2019-10-16

## 2019-09-27 RX ORDER — SODIUM CHLORIDE 9 MG/ML
3 INJECTION INTRAMUSCULAR; INTRAVENOUS; SUBCUTANEOUS EVERY 8 HOURS
Refills: 0 | Status: DISCONTINUED | OUTPATIENT
Start: 2019-09-27 | End: 2019-09-27

## 2019-09-27 RX ORDER — RIVAROXABAN 15 MG-20MG
20 KIT ORAL
Refills: 0 | Status: CANCELLED | OUTPATIENT
Start: 2019-10-17 | End: 2019-09-27

## 2019-09-27 RX ADMIN — RIVAROXABAN 15 MILLIGRAM(S): KIT at 18:59

## 2019-09-27 RX ADMIN — Medication 20 MILLIEQUIVALENT(S): at 06:27

## 2019-09-27 RX ADMIN — SODIUM CHLORIDE 3 MILLILITER(S): 9 INJECTION INTRAMUSCULAR; INTRAVENOUS; SUBCUTANEOUS at 06:09

## 2019-09-27 RX ADMIN — CHLORHEXIDINE GLUCONATE 1 APPLICATION(S): 213 SOLUTION TOPICAL at 04:41

## 2019-09-27 RX ADMIN — PANTOPRAZOLE SODIUM 40 MILLIGRAM(S): 20 TABLET, DELAYED RELEASE ORAL at 06:27

## 2019-09-27 RX ADMIN — Medication 90 MILLIGRAM(S): at 06:27

## 2019-09-27 RX ADMIN — SODIUM CHLORIDE 166.67 MILLILITER(S): 9 INJECTION INTRAMUSCULAR; INTRAVENOUS; SUBCUTANEOUS at 09:20

## 2019-09-27 RX ADMIN — RIVAROXABAN 15 MILLIGRAM(S): KIT at 09:20

## 2019-09-27 RX ADMIN — Medication 40 MILLIEQUIVALENT(S): at 09:20

## 2019-09-27 NOTE — DISCHARGE NOTE PROVIDER - CARE PROVIDER_API CALL
Vincenzo Boyd (MD)  Cardiology; Internal Medicine  [address], [address]  [place] [address]  Phone: [phone]  Fax: [phone]  Follow Up Time:     Dann Auguste (DO)  Internal Medicine; Nuclear Cardiology  [address] [place] [address]  Phone: [phone]  Fax: [phone]  Follow Up Time:

## 2019-09-27 NOTE — DISCHARGE NOTE NURSING/CASE MANAGEMENT/SOCIAL WORK - PATIENT PORTAL LINK FT
You can access the FollowMyHealth Patient Portal offered by [facility] by registering at the following website: [email or web address]. By joining [employer]’s FollowMyHealth portal, you will also be able to view your health information using other applications (apps) compatible with our system.

## 2019-09-27 NOTE — PROGRESS NOTE ADULT - SUBJECTIVE AND OBJECTIVE BOX
Patient is a 74y old  Female who presents with a chief complaint of PE (25 Sep 2019 10:12)    HPI:  This is a 74 year old  female with PMH of HTN, HLD, DM2, Ovarian CA s/p BRET who was transferred from [facility] CCU for Saddle PE w/ RV strain.  Patient states she was home yesterday afternoon when she experienced acute shortness of breath accompanied by midsternal chest pain and diaphoresis.  Chest pain described as "midsternal 4/10 dull non-radiating continuous pain" and increases with inspiration.  EMS called who brought patient to [facility] ER.  S/P Chest CTA revealing saddle PE extending into all lobar branches as well as several segmental/subsegmental branches with RV strain.  Patient started on a Heparin gtt and Bipap for acute respiratory distress.  She was than transferred to [facility] CCU2 for continuum of care.  Upon transfer, patient was noted to have HTN emergency with /100 for which she was started on a Cardene gtt.  Currently patient remains off Bipap, on 4L NC with Sp02 92-95%.  Still states +SOB, denies chest pain, palpitations, abd pain, n/v/d/c.  Denies any personal or family history of PE or DVT in the past.  Full code. (24 Sep 2019 05:55)    Overnight Event: No issues overnight    REVIEW OF SYSTEMS: Feels weak. No SOB or chest pain  	         atorvastatin 20 milliGRAM(s) Oral at bedtime  chlorhexidine 2% Cloths 1 Application(s) Topical <User Schedule>  dextrose 40% Gel 15 Gram(s) Oral once PRN  dextrose 5%. 1000 milliLiter(s) IV Continuous <Continuous>  dextrose 50% Injectable 12.5 Gram(s) IV Push once  dextrose 50% Injectable 25 Gram(s) IV Push once  dextrose 50% Injectable 25 Gram(s) IV Push once  glucagon  Injectable 1 milliGRAM(s) IntraMuscular once PRN  insulin lispro (HumaLOG) corrective regimen sliding scale   SubCutaneous three times a day before meals  insulin lispro (HumaLOG) corrective regimen sliding scale   SubCutaneous at bedtime  NIFEdipine XL 90 milliGRAM(s) Oral daily  pantoprazole    Tablet 40 milliGRAM(s) Oral before breakfast  rivaroxaban 15 milliGRAM(s) Oral two times a day with meals  sodium chloride 0.9%. 1000 milliLiter(s) IV Continuous <Continuous>                            13.2   14.8  )-----------( 230      ( 26 Sep 2019 02:22 )             41.5       Hemoglobin: 13.2 g/dL (09-26 @ 02:22)  Hemoglobin: 12.9 g/dL (09-25 @ 11:16)  Hemoglobin: 13.1 g/dL (09-24 @ 23:41)  Hemoglobin: 12.3 g/dL (09-24 @ 06:09)  Hemoglobin: 12.1 g/dL (09-24 @ 01:47)      09-26    140  |  101  |  23  ----------------------------<  167<H>  4.4   |  25  |  1.35<H>    Ca    9.1      26 Sep 2019 02:22  Phos  4.2     09-26  Mg     1.9     09-26    TPro  7.2  /  Alb  3.6  /  TBili  0.6  /  DBili  x   /  AST  61<H>  /  ALT  70<H>  /  AlkPhos  117  09-26    Creatinine Trend: 1.35<--, 1.01<--, 1.07<--, 0.99<--, 1.36<--    COAGS:           T(C): 37.2 (09-26-19 @ 14:00), Max: 37.3 (09-26-19 @ 07:00)  HR: 71 (09-27-19 @ 02:00) (67 - 85)  BP: 137/66 (09-27-19 @ 02:00) (106/58 - 169/83)  RR: 18 (09-27-19 @ 02:00) (15 - 27)  SpO2: 96% (09-27-19 @ 02:00) (87% - 96%)  Wt(kg): --    I&O's Summary    25 Sep 2019 07:01  -  26 Sep 2019 07:00  --------------------------------------------------------  IN: 1634 mL / OUT: 450 mL / NET: 1184 mL    26 Sep 2019 07:01  -  27 Sep 2019 03:41  --------------------------------------------------------  IN: 1752 mL / OUT: 0 mL / NET: 1752 mL        Appearance: Normal	  HEENT:   Normal oral mucosa, PERRL, EOMI	  Cardiovascular: Normal S1 S2, No JVD, No murmurs, No edema  Respiratory: Lungs clear to auscultation	  Psychiatry: A & O x 3, Mood & affect appropriate  Gastrointestinal:  Soft, Non-tender, + BS	  Skin: No rashes, No ecchymoses, No cyanosis	  Neurologic: Non-focal  Extremities: Normal range of motion, No clubbing, cyanosis or edema  Vascular: Peripheral pulses palpable 2+ bilaterally    TELEMETRY: 	nsr    ECG:  	Normal Sinus Rhythm  RADIOLOGY:  < from: CT Abdomen and Pelvis w/ Oral Cont and w/ IV Cont (09.25.19 @ 14:00) >  IMPRESSION: No evidence of metastatic disease.     < end of copied text >       < from: CT Angio Chest w/ IV Cont (09.24.19 @ 00:26) >  IMPRESSION:     Pulmonary emboli in the bilateral main pulmonary arteries extending into   all lobar branches as well as several segmental and subsegmental   pulmonary arteries. Mild increase in the right ventricular to left   ventricular ratio concerning for right heart strain. Echocardiogram can   be performed for further evaluation as clinically indicated    < end of copied text >    OTHER: 	    PREVIOUS DIAGNOSTIC TESTING:    [ ] Echocardiogram: < from: TTE with Doppler (w/Cont) (09.24.19 @ 11:36) >  Conclusions: EF 65-70%  1. Normal mitral valve. Mitral valve chordal systolic  anterior motion. Minimal mitral regurgitation.  2. Normal trileaflet aortic valve. No aortic valve  regurgitation seen.  3. Septal hypertrophy, the septum measures 1.8cm with mild  concentric left ventricular hypertrophy, consistent with  hypertrophic cardiomyopathy. Consider cardiac MRI for  further evaluation if clinically indicated.  4. Endocardial visualization enhanced with intravenous  injection of Ultrasonic Enhancing Agent (Definity). Normal  left ventricular systolic function. No segmental wall  motion abnormalities. Flattening of the interventricular  septum in both systole and diastole is  consistent with  right ventricular pressure overload.  5. Mild diastolic dysfunction (Stage I).  6. Mild ight ventricular systolic dysfunction with  hyperdynamic apical wall motion is seen consistent with  Bates's sign.  7. Normal pericardium with no pericardial effusion.  *** No previous Echo exam.    < end of copied text >    < from: VA Duplex Lower Ext Vein Scan, Bilat (09.24.19 @ 14:06) >  IMPRESSION:     There is isolated occlusive thrombus of the left posterior tibial   peroneal trunk.    < end of copied text >

## 2019-09-27 NOTE — PROGRESS NOTE ADULT - PROBLEM SELECTOR PLAN 2
TTE pending for Possible Hypertrophic Cardiomyopathy  Will do cardiac MRI
Cardiac MRI as outpatient  Follow-up with Dr. Boyd as outpatient
Possible Hypertrophic Cardiomyopathy suggested on TTE  Will do cardiac MRI

## 2019-09-27 NOTE — DISCHARGE NOTE PROVIDER - NSDCCPCAREPLAN_GEN_ALL_CORE_FT
PRINCIPAL DISCHARGE DIAGNOSIS  Diagnosis: Acute saddle pulmonary embolus  Assessment and Plan of Treatment:   continue Xarelto 15mg bid for 21 days (until 10/16 then start on 10/17 Xarelto 20mg QD.         SECONDARY DISCHARGE DIAGNOSES  Diagnosis: Hypertrophic cardiomyopathy  Assessment and Plan of Treatment:   Follow-up with Dr. Boyd    Diagnosis: STORMY (acute kidney injury)  Assessment and Plan of Treatment:   Follow-up with your primary Care Physician to obtain blood work    Diagnosis: Diabetes mellitus  Assessment and Plan of Treatment:   continue your home medication

## 2019-09-27 NOTE — PROGRESS NOTE ADULT - PROBLEM SELECTOR PROBLEM 1
Acute saddle pulmonary embolism with acute cor pulmonale

## 2019-09-27 NOTE — PROGRESS NOTE ADULT - PROBLEM SELECTOR PLAN 1
cont Xarelto BID for PE    wean off O2 with goal of O2 >90%
Xarelto 15mg bid for 21 days then Xarelto 20mg QD
will transition Heparin to Xarelto 15mg bid for 21 days the..  20mg QD  wean off O2 with goal of O2 >90%

## 2019-09-27 NOTE — PROGRESS NOTE ADULT - PROBLEM SELECTOR PROBLEM 3
Pt wife is asking about paperwork for pt's diabetic shoes. Please call with update.
STORMY (acute kidney injury)

## 2019-09-27 NOTE — PROGRESS NOTE ADULT - SUBJECTIVE AND OBJECTIVE BOX
Patient is a 74y old  Female who presents with a chief complaint of PE (27 Sep 2019 03:40)  HPI:  This is a 74 year old  female with PMH of HTN, HLD, DM2, Ovarian CA s/p BRET who was transferred from [facility] CCU for Saddle PE w/ RV strain.  Patient states she was home yesterday afternoon when she experienced acute shortness of breath accompanied by midsternal chest pain and diaphoresis.  Chest pain described as "midsternal 4/10 dull non-radiating continuous pain" and increases with inspiration.  EMS called who brought patient to [facility] ER.  S/P Chest CTA revealing saddle PE extending into all lobar branches as well as several segmental/subsegmental branches with RV strain.  Patient started on a Heparin gtt and Bipap for acute respiratory distress.  She was than transferred to [facility] CCU2 for continuum of care.  Upon transfer, patient was noted to have HTN emergency with /100 for which she was started on a Cardene gtt.  Currently patient remains off Bipap, on 4L NC with Sp02 92-95%.  Still states +SOB, denies chest pain, palpitations, abd pain, n/v/d/c.  Denies any personal or family history of PE or DVT in the past.  Full code. (24 Sep 2019 05:55)      Overnight Event:    REVIEW OF SYSTEMS:  	    MEDICATIONS  (STANDING):  atorvastatin 20 milliGRAM(s) Oral at bedtime  chlorhexidine 2% Cloths 1 Application(s) Topical <User Schedule>  dextrose 5%. 1000 milliLiter(s) (50 mL/Hr) IV Continuous <Continuous>  dextrose 50% Injectable 12.5 Gram(s) IV Push once  dextrose 50% Injectable 25 Gram(s) IV Push once  dextrose 50% Injectable 25 Gram(s) IV Push once  insulin lispro (HumaLOG) corrective regimen sliding scale   SubCutaneous three times a day before meals  insulin lispro (HumaLOG) corrective regimen sliding scale   SubCutaneous at bedtime  NIFEdipine XL 90 milliGRAM(s) Oral daily  pantoprazole    Tablet 40 milliGRAM(s) Oral before breakfast  rivaroxaban 15 milliGRAM(s) Oral two times a day with meals  sodium chloride 0.9% lock flush 3 milliLiter(s) IV Push every 8 hours  sodium chloride 0.9%. 1000 milliLiter(s) (100 mL/Hr) IV Continuous <Continuous>    MEDICATIONS  (PRN):  dextrose 40% Gel 15 Gram(s) Oral once PRN Blood Glucose LESS THAN 70 milliGRAM(s)/deciliter  glucagon  Injectable 1 milliGRAM(s) IntraMuscular once PRN Glucose LESS THAN 70 milligrams/deciliter        PHYSICAL EXAM:  Vital Signs Last 24 Hrs  T(C): 37.2 (26 Sep 2019 14:00), Max: 37.2 (26 Sep 2019 14:00)  T(F): 98.9 (26 Sep 2019 14:00), Max: 98.9 (26 Sep 2019 14:00)  HR: 69 (27 Sep 2019 06:00) (68 - 85)  BP: 133/58 (27 Sep 2019 06:00) (106/58 - 169/83)  BP(mean): 84 (27 Sep 2019 06:00) (75 - 115)  RR: 19 (27 Sep 2019 06:00) (18 - 27)  SpO2: 95% (27 Sep 2019 06:00) (87% - 96%)  I&O's Summary    26 Sep 2019 07:01  -  27 Sep 2019 07:00  --------------------------------------------------------  IN: 1752 mL / OUT: 0 mL / NET: 1752 mL        Appearance: Normal	  HEENT:   Normal oral mucosa, PERRL, EOMI	  Lymphatic: No lymphadenopathy  Cardiovascular: Normal S1 S2, No JVD, No murmurs, No edema  Respiratory: Lungs clear to auscultation	  Psychiatry: A & O x 3, Mood & affect appropriate  Gastrointestinal:  Soft, Non-tender, + BS	  Skin: No rashes, No ecchymoses, No cyanosis	  Neurologic: Non-focal  Extremities: Normal range of motion, No clubbing, cyanosis or edema  Vascular: Peripheral pulses palpable 2+ bilaterally    LABS:	 	                        12.3   15.5  )-----------( 229      ( 27 Sep 2019 04:45 )             39.5     Auto Eosinophil # x     / Auto Eosinophil % x     / Auto Neutrophil # x     / Auto Neutrophil % x     / BANDS % x                            13.2   14.8  )-----------( 230      ( 26 Sep 2019 02:22 )             41.5     Auto Eosinophil # x     / Auto Eosinophil % x     / Auto Neutrophil # x     / Auto Neutrophil % x     / BANDS % x                            12.9   17.6  )-----------( 234      ( 25 Sep 2019 11:16 )             41.5     Auto Eosinophil # 0.4   / Auto Eosinophil % 2.3   / Auto Neutrophil # 8.0   / Auto Neutrophil % 45.3  / BANDS % x        INR: 1.99 ratio (09-27 @ 04:45)  INR: 1.19 ratio (09-26 @ 02:26)  INR: 1.21 ratio (09-25 @ 11:16)    09-27    137  |  101  |  24<H>  ----------------------------<  144<H>  3.9   |  25  |  1.32<H>  09-26    140  |  101  |  23  ----------------------------<  167<H>  4.4   |  25  |  1.35<H>  09-25    137  |  98  |  14  ----------------------------<  118<H>  4.2   |  28  |  1.01    Ca    9.0      27 Sep 2019 04:45  Mg     2.0     09-27  Phos  4.3     09-27  TPro  6.9  /  Alb  3.4  /  TBili  0.6  /  DBili  x   /  AST  29  /  ALT  44  /  AlkPhos  96  09-27  TPro  7.2  /  Alb  3.6  /  TBili  0.6  /  DBili  x   /  AST  61<H>  /  ALT  70<H>  /  AlkPhos  117  09-26        proBNP: Serum Pro-Brain Natriuretic Peptide: 662.2 pg/mL (09-23 @ 19:17)    Lipid Profile: 09-24 Chol 178 <H> HDL 38<L> Trig 96  HgA1c: 6.9 % (09-24 @ 09:11)    TSH: Thyroid Stimulating Hormone, Serum: 1.14 uIU/mL (09-24 @ 10:13)      TELEMETRY: 	    ECG:  	  RADIOLOGY:  OTHER: 	    PREVIOUS DIAGNOSTIC TESTING:    [ ] Echocardiogram: < from: TTE with Doppler (w/Cont) (09.24.19 @ 11:36) >  Conclusions:  1. Normal mitral valve. Mitral valve chordal systolic  anterior motion. Minimal mitral regurgitation.  2. Normal trileaflet aortic valve. No aortic valve  regurgitationseen.  3. Septal hypertrophy, the septum measures 1.8cm with mild  concentric left ventricular hypertrophy, consistent with  hypertrophic cardiomyopathy. Consider cardiac MRI for  further evaluation if clinically indicated.  4. Endocardial visualization enhanced with intravenous  injection of Ultrasonic Enhancing Agent (Definity). Normal  left ventricular systolic function. No segmental wall  motion abnormalities. Flattening of the interventricular  septum in both systole and diastole is  consistent with  right ventricular pressure overload.  5. Mild diastolic dysfunction (Stage I).  6. Mild ight ventricular systolic dysfunction with  hyperdynamic apical wall motion is seen consistent with  Bates's sign.  7. Normal pericardium with no pericardial effusion.  *** No previous Echo exam.    < end of copied text >    [ ]  Catheterization:  	  MARIAH Cooley  Contact #[phone] Patient is a 74y old  Female who presents with a chief complaint of PE (27 Sep 2019 03:40)  HPI:  This is a 74 year old  female with PMH of HTN, HLD, DM2, Ovarian CA s/p BRET who was transferred from [facility] CCU for Saddle PE w/ RV strain.  Patient states she was home yesterday afternoon when she experienced acute shortness of breath accompanied by midsternal chest pain and diaphoresis.  Chest pain described as "midsternal 4/10 dull non-radiating continuous pain" and increases with inspiration.  EMS called who brought patient to [facility] ER.  S/P Chest CTA revealing saddle PE extending into all lobar branches as well as several segmental/subsegmental branches with RV strain.  Patient started on a Heparin gtt and Bipap for acute respiratory distress.  She was than transferred to [facility] CCU2 for continuum of care.  Upon transfer, patient was noted to have HTN emergency with /100 for which she was started on a Cardene gtt.  Currently patient remains off Bipap, on 4L NC with Sp02 92-95%.  Still states +SOB, denies chest pain, palpitations, abd pain, n/v/d/c.  Denies any personal or family history of PE or DVT in the past.  Full code. (24 Sep 2019 05:55)      Overnight Event: No issues overnight    REVIEW OF SYSTEMS: No chest pain, SOB, anxiety or pain  	    MEDICATIONS  (STANDING):  atorvastatin 20 milliGRAM(s) Oral at bedtime  chlorhexidine 2% Cloths 1 Application(s) Topical <User Schedule>  dextrose 5%. 1000 milliLiter(s) (50 mL/Hr) IV Continuous <Continuous>  dextrose 50% Injectable 12.5 Gram(s) IV Push once  dextrose 50% Injectable 25 Gram(s) IV Push once  dextrose 50% Injectable 25 Gram(s) IV Push once  insulin lispro (HumaLOG) corrective regimen sliding scale   SubCutaneous three times a day before meals  insulin lispro (HumaLOG) corrective regimen sliding scale   SubCutaneous at bedtime  NIFEdipine XL 90 milliGRAM(s) Oral daily  pantoprazole    Tablet 40 milliGRAM(s) Oral before breakfast  rivaroxaban 15 milliGRAM(s) Oral two times a day with meals  sodium chloride 0.9% lock flush 3 milliLiter(s) IV Push every 8 hours  sodium chloride 0.9%. 1000 milliLiter(s) (100 mL/Hr) IV Continuous <Continuous>    MEDICATIONS  (PRN):  dextrose 40% Gel 15 Gram(s) Oral once PRN Blood Glucose LESS THAN 70 milliGRAM(s)/deciliter  glucagon  Injectable 1 milliGRAM(s) IntraMuscular once PRN Glucose LESS THAN 70 milligrams/deciliter      PHYSICAL EXAM:  Vital Signs Last 24 Hrs  T(C): 37.2 (26 Sep 2019 14:00), Max: 37.2 (26 Sep 2019 14:00)  T(F): 98.9 (26 Sep 2019 14:00), Max: 98.9 (26 Sep 2019 14:00)  HR: 69 (27 Sep 2019 06:00) (68 - 85)  BP: 133/58 (27 Sep 2019 06:00) (106/58 - 169/83)  BP(mean): 84 (27 Sep 2019 06:00) (75 - 115)  RR: 19 (27 Sep 2019 06:00) (18 - 27)  SpO2: 95% (27 Sep 2019 06:00) (87% - 96%)  I&O's Summary    26 Sep 2019 07:01  -  27 Sep 2019 07:00  --------------------------------------------------------  IN: 1752 mL / OUT: 0 mL / NET: 1752 mL    Appearance: Normal	  HEENT:   Normal oral mucosa, PERRL, EOMI	  Lymphatic: No lymphadenopathy  Cardiovascular: Normal S1 S2, No JVD, No murmurs, No edema  Respiratory: Lungs clear to auscultation	  Psychiatry: A & O x 3, Mood & affect appropriate  Gastrointestinal:  Soft, Non-tender, + BS	  Skin: No rashes, No ecchymoses, No cyanosis	  Neurologic: Non-focal  Extremities: Normal range of motion, No clubbing, cyanosis or edema  Vascular: Peripheral pulses palpable 2+ bilaterally    LABS:	 	                        12.3   15.5  )-----------( 229      ( 27 Sep 2019 04:45 )             39.5     Auto Eosinophil # x     / Auto Eosinophil % x     / Auto Neutrophil # x     / Auto Neutrophil % x     / BANDS % x                            13.2   14.8  )-----------( 230      ( 26 Sep 2019 02:22 )             41.5     Auto Eosinophil # x     / Auto Eosinophil % x     / Auto Neutrophil # x     / Auto Neutrophil % x     / BANDS % x                            12.9   17.6  )-----------( 234      ( 25 Sep 2019 11:16 )             41.5     Auto Eosinophil # 0.4   / Auto Eosinophil % 2.3   / Auto Neutrophil # 8.0   / Auto Neutrophil % 45.3  / BANDS % x        INR: 1.99 ratio (09-27 @ 04:45)  INR: 1.19 ratio (09-26 @ 02:26)  INR: 1.21 ratio (09-25 @ 11:16)    09-27    137  |  101  |  24<H>  ----------------------------<  144<H>  3.9   |  25  |  1.32<H>  09-26    140  |  101  |  23  ----------------------------<  167<H>  4.4   |  25  |  1.35<H>  09-25    137  |  98  |  14  ----------------------------<  118<H>  4.2   |  28  |  1.01    Ca    9.0      27 Sep 2019 04:45  Mg     2.0     09-27  Phos  4.3     09-27  TPro  6.9  /  Alb  3.4  /  TBili  0.6  /  DBili  x   /  AST  29  /  ALT  44  /  AlkPhos  96  09-27  TPro  7.2  /  Alb  3.6  /  TBili  0.6  /  DBili  x   /  AST  61<H>  /  ALT  70<H>  /  AlkPhos  117  09-26  proBNP: Serum Pro-Brain Natriuretic Peptide: 662.2 pg/mL (09-23 @ 19:17)  Lipid Profile: 09-24 Chol 178 <H> HDL 38<L> Trig 96  HgA1c: 6.9 % (09-24 @ 09:11)  TSH: Thyroid Stimulating Hormone, Serum: 1.14 uIU/mL (09-24 @ 10:13)      TELEMETRY: 	No ectopy    ECG:  	Normal sinus rhythm  RADIOLOGY: < from: CT Angio Chest w/ IV Cont (09.24.19 @ 00:26) >  IMPRESSION:     Pulmonary emboli in the bilateral main pulmonary arteries extending into   all lobar branches as well as several segmental and subsegmental   pulmonary arteries. Mild increase in the right ventricular to left   ventricular ratio concerning for right heart strain. Echocardiogram can   be performed for further evaluation as clinically indicated    < end of copied text >    OTHER: < from: TTE with Doppler (w/Cont) (09.24.19 @ 11:36) >  Conclusions:  1. Normal mitral valve. Mitral valve chordal systolic  anterior motion. Minimal mitral regurgitation.  2. Normal trileaflet aortic valve. No aortic valve  regurgitationseen.  3. Septal hypertrophy, the septum measures 1.8cm with mild  concentric left ventricular hypertrophy, consistent with  hypertrophic cardiomyopathy. Consider cardiac MRI for  further evaluation if clinically indicated.  4. Endocardial visualization enhanced with intravenous  injection of Ultrasonic Enhancing Agent (Definity). Normal  left ventricular systolic function. No segmental wall  motion abnormalities. Flattening of the interventricular  septum in both systole and diastole is  consistent with  right ventricular pressure overload.  5. Mild diastolic dysfunction (Stage I).  6. Mild ight ventricular systolic dysfunction with  hyperdynamic apical wall motion is seen consistent with  Bates's sign.  7. Normal pericardium with no pericardial effusion.  *** No previous Echo exam.    < end of copied text >  	    PREVIOUS DIAGNOSTIC TESTING:    [ ] Echocardiogram: < from: TTE with Doppler (w/Cont) (09.24.19 @ 11:36) >  Conclusions:  1. Normal mitral valve. Mitral valve chordal systolic  < from: CT Abdomen and Pelvis w/ Oral Cont and w/ IV Cont (09.25.19 @ 14:00) >  IMPRESSION: No evidence of metastatic disease.       < end of copied text >  anterior motion. Minimal mitral regurgitation.  2. Normal trileaflet aortic valve. No aortic valve  regurgitationseen.  3. Septal hypertrophy, the septum measures 1.8cm with mild  concentric left ventricular hypertrophy, consistent with  hypertrophic cardiomyopathy. Consider cardiac MRI for  further evaluation if clinically indicated.  4. Endocardial visualization enhanced with intravenous  injection of Ultrasonic Enhancing Agent (Definity). Normal  left ventricular systolic function. No segmental wall  motion abnormalities. Flattening of the interventricular  septum in both systole and diastole is  consistent with  right ventricular pressure overload.  5. Mild diastolic dysfunction (Stage I).  6. Mild ight ventricular systolic dysfunction with  hyperdynamic apical wall motion is seen consistent with  Bates's sign.  7. Normal pericardium with no pericardial effusion.  *** No previous Echo exam.    < end of copied text >    	  [facility]  Contact #[phone]

## 2019-09-27 NOTE — DISCHARGE NOTE PROVIDER - HOSPITAL COURSE
74 F HTN, HLD, DM2, gout, ovarian Ca (BRET, chemo ’07), tx from [facility] w/ saddle PE w/ RV strain req bipap (now off), w/ Left PT peroneal trunk DVT, course complicated by HTN emergency requiring cardene drip. Now on oral meds with stable blood pressure.

## 2019-09-27 NOTE — PROGRESS NOTE ADULT - ASSESSMENT
73 y/o F with PMH of HTN, HLD, T2DM, ovarian cancer s/p BSO-BRET and chemo in 2007, and gout who presented to [facility] with acute onset SOB and was transferred to [facility] for acute PE with RV strain.  HCCB hypertensive emergency requiring cardene now controlled on PO meds.
74F Hx HTN, HLD, DM2 who was transferred from [facility] for saddle PE with RV strain.  Off Bipap, on 4L NC with SP02 > 92%.  HTN emergency requiring Cardene gtt now off and back on po antihypertensive    #Acute PE  - TTE revealing EF 65-70%, septal hypertrophy, LVH, possible hypertropic CM    +Bates's sign, +RV strain/overload; will need repeat echo outpatient   - S/P [facility] duplex BLE +DVT L posterior tibial peroneal truck   - Malignancy screening: CT A/P with PO/IV contrast in AM  - Cont. full Heparin gtt per protocol, trend coags/CBC  - No advanced therapies planned at this time  - Supplemental 02 prn, bedrest   - 9/25 Duplex b/l LE: There is isolated occlusive thrombus of the left posterior tibial  peroneal trunk.      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
75 y/o F with PMH of HTN, HLD, T2DM, ovarian cancer s/p BSO-BRET and chemo in 2007, and gout who presented to [facility] with acute onset SOB and was transferred to [facility] for acute PE with RV strain.  HCCB hypertensive emergency requiring cardene now controlled on PO meds.
75 y/o F with PMH of HTN, HLD, T2DM, ovarian cancer s/p BSO-BRET and chemo in 2007, and gout who presented to [facility] with acute onset SOB and was transferred to [facility] for acute PE with RV strain.  HCCB hypertensive emergency requiring cardene now controlled on PO meds.

## 2019-09-27 NOTE — PROGRESS NOTE ADULT - ATTENDING COMMENTS
Gentle IV fluids  Transition to DOAC  Appreciate CCU care      Kalyani [id number]
Saddle PE with RV strain. Appreciate vascular cardiology consult.  Follow-up serial TTE and CT scans - no evidence of malignancy seen.  Trend biomarkers.  Continue heparin gtt. Will transition to oral anticoagulant.  Supplemental oxygen as needed.
Saddle PE with RV strain. Appreciate vascular cardiology consult.  Follow-up serial TTE and CT.  Trend biomarkers.  Continue heparin gtt.  Supplemental oxygen as needed.
Continue anticoagulation.  Follow-up as outpatient with vascular cardiology.

## 2019-10-13 PROCEDURE — 84295 ASSAY OF SERUM SODIUM: CPT

## 2019-10-13 PROCEDURE — 80061 LIPID PANEL: CPT

## 2019-10-13 PROCEDURE — 82803 BLOOD GASES ANY COMBINATION: CPT

## 2019-10-13 PROCEDURE — 85730 THROMBOPLASTIN TIME PARTIAL: CPT

## 2019-10-13 PROCEDURE — 85610 PROTHROMBIN TIME: CPT

## 2019-10-13 PROCEDURE — 85014 HEMATOCRIT: CPT

## 2019-10-13 PROCEDURE — 80048 BASIC METABOLIC PNL TOTAL CA: CPT

## 2019-10-13 PROCEDURE — 94640 AIRWAY INHALATION TREATMENT: CPT

## 2019-10-13 PROCEDURE — 84443 ASSAY THYROID STIM HORMONE: CPT

## 2019-10-13 PROCEDURE — 83605 ASSAY OF LACTIC ACID: CPT

## 2019-10-13 PROCEDURE — 86901 BLOOD TYPING SEROLOGIC RH(D): CPT

## 2019-10-13 PROCEDURE — 86850 RBC ANTIBODY SCREEN: CPT

## 2019-10-13 PROCEDURE — 97162 PT EVAL MOD COMPLEX 30 MIN: CPT

## 2019-10-13 PROCEDURE — 82435 ASSAY OF BLOOD CHLORIDE: CPT

## 2019-10-13 PROCEDURE — 82962 GLUCOSE BLOOD TEST: CPT

## 2019-10-13 PROCEDURE — 84132 ASSAY OF SERUM POTASSIUM: CPT

## 2019-10-13 PROCEDURE — 82947 ASSAY GLUCOSE BLOOD QUANT: CPT

## 2019-10-13 PROCEDURE — 85027 COMPLETE CBC AUTOMATED: CPT

## 2019-10-13 PROCEDURE — 80053 COMPREHEN METABOLIC PANEL: CPT

## 2019-10-13 PROCEDURE — 86900 BLOOD TYPING SEROLOGIC ABO: CPT

## 2019-10-13 PROCEDURE — 83036 HEMOGLOBIN GLYCOSYLATED A1C: CPT

## 2019-10-13 PROCEDURE — 82330 ASSAY OF CALCIUM: CPT

## 2019-10-13 PROCEDURE — C8929: CPT

## 2019-10-13 PROCEDURE — 93970 EXTREMITY STUDY: CPT

## 2019-10-13 PROCEDURE — 84100 ASSAY OF PHOSPHORUS: CPT

## 2019-10-13 PROCEDURE — 74177 CT ABD & PELVIS W/CONTRAST: CPT

## 2019-10-13 PROCEDURE — 94660 CPAP INITIATION&MGMT: CPT

## 2019-10-13 PROCEDURE — 83735 ASSAY OF MAGNESIUM: CPT

## 2019-10-13 PROCEDURE — 93005 ELECTROCARDIOGRAM TRACING: CPT

## 2019-10-17 RX ORDER — RIVAROXABAN 15 MG-20MG
1 KIT ORAL
Qty: 30 | Refills: 3
Start: 2019-10-17 | End: 2020-01-01

## 2020-01-01 ENCOUNTER — INPATIENT (INPATIENT)
Facility: HOSPITAL | Age: 75
LOS: 0 days | End: 2020-12-06
Attending: INTERNAL MEDICINE | Admitting: INTERNAL MEDICINE
Payer: MEDICARE

## 2020-01-01 VITALS — RESPIRATION RATE: 24 BRPM | OXYGEN SATURATION: 88 %

## 2020-01-01 VITALS — HEART RATE: 56 BPM

## 2020-01-01 DIAGNOSIS — I46.9 CARDIAC ARREST, CAUSE UNSPECIFIED: ICD-10-CM

## 2020-01-01 DIAGNOSIS — Z90.710 ACQUIRED ABSENCE OF BOTH CERVIX AND UTERUS: Chronic | ICD-10-CM

## 2020-01-01 DIAGNOSIS — I26.99 OTHER PULMONARY EMBOLISM WITHOUT ACUTE COR PULMONALE: ICD-10-CM

## 2020-01-01 DIAGNOSIS — Z90.49 ACQUIRED ABSENCE OF OTHER SPECIFIED PARTS OF DIGESTIVE TRACT: Chronic | ICD-10-CM

## 2020-01-01 DIAGNOSIS — Z98.89 OTHER SPECIFIED POSTPROCEDURAL STATES: Chronic | ICD-10-CM

## 2020-01-01 LAB
ALBUMIN SERPL ELPH-MCNC: 3 G/DL — LOW (ref 3.3–5)
ALP SERPL-CCNC: 66 U/L — SIGNIFICANT CHANGE UP (ref 40–120)
ALT FLD-CCNC: 464 U/L — HIGH (ref 4–33)
ANION GAP SERPL CALC-SCNC: 32 MMOL/L — HIGH (ref 7–14)
APPEARANCE UR: CLEAR — SIGNIFICANT CHANGE UP
APTT BLD: 34.7 SEC — SIGNIFICANT CHANGE UP (ref 27–36.3)
AST SERPL-CCNC: 811 U/L — HIGH (ref 4–32)
BACTERIA # UR AUTO: ABNORMAL
BILIRUB SERPL-MCNC: 0.8 MG/DL — SIGNIFICANT CHANGE UP (ref 0.2–1.2)
BILIRUB UR-MCNC: NEGATIVE — SIGNIFICANT CHANGE UP
BLOOD GAS VENOUS COMPREHENSIVE RESULT: SIGNIFICANT CHANGE UP
BUN SERPL-MCNC: 36 MG/DL — HIGH (ref 7–23)
CALCIUM SERPL-MCNC: 8.7 MG/DL — SIGNIFICANT CHANGE UP (ref 8.4–10.5)
CHLORIDE SERPL-SCNC: 99 MMOL/L — SIGNIFICANT CHANGE UP (ref 98–107)
CO2 SERPL-SCNC: 11 MMOL/L — LOW (ref 22–31)
COLOR SPEC: YELLOW — SIGNIFICANT CHANGE UP
CREAT SERPL-MCNC: 2.17 MG/DL — HIGH (ref 0.5–1.3)
CULTURE RESULTS: NO GROWTH — SIGNIFICANT CHANGE UP
DIFF PNL FLD: ABNORMAL
EPI CELLS # UR: 6 /HPF — HIGH (ref 0–5)
GLUCOSE BLDC GLUCOMTR-MCNC: 123 MG/DL — HIGH (ref 70–99)
GLUCOSE BLDC GLUCOMTR-MCNC: 155 MG/DL — HIGH (ref 70–99)
GLUCOSE BLDC GLUCOMTR-MCNC: 158 MG/DL — HIGH (ref 70–99)
GLUCOSE BLDC GLUCOMTR-MCNC: 165 MG/DL — HIGH (ref 70–99)
GLUCOSE BLDC GLUCOMTR-MCNC: 85 MG/DL — SIGNIFICANT CHANGE UP (ref 70–99)
GLUCOSE SERPL-MCNC: 172 MG/DL — HIGH (ref 70–99)
GLUCOSE UR QL: NEGATIVE — SIGNIFICANT CHANGE UP
HCT VFR BLD CALC: 48.4 % — HIGH (ref 34.5–45)
HGB BLD-MCNC: 13.4 G/DL — SIGNIFICANT CHANGE UP (ref 11.5–15.5)
INR BLD: 1.67 RATIO — HIGH (ref 0.88–1.17)
KETONES UR-MCNC: NEGATIVE — SIGNIFICANT CHANGE UP
LEUKOCYTE ESTERASE UR-ACNC: NEGATIVE — SIGNIFICANT CHANGE UP
MCHC RBC-ENTMCNC: 27.7 GM/DL — LOW (ref 32–36)
MCHC RBC-ENTMCNC: 28.1 PG — SIGNIFICANT CHANGE UP (ref 27–34)
MCV RBC AUTO: 101.5 FL — HIGH (ref 80–100)
NITRITE UR-MCNC: NEGATIVE — SIGNIFICANT CHANGE UP
NRBC # BLD: 2 /100 WBCS — SIGNIFICANT CHANGE UP
NRBC # FLD: 0.2 K/UL — HIGH
PH UR: 6 — SIGNIFICANT CHANGE UP (ref 5–8)
PLATELET # BLD AUTO: 119 K/UL — LOW (ref 150–400)
POTASSIUM SERPL-MCNC: 5.4 MMOL/L — HIGH (ref 3.5–5.3)
POTASSIUM SERPL-SCNC: 5.4 MMOL/L — HIGH (ref 3.5–5.3)
PROT SERPL-MCNC: 6.8 G/DL — SIGNIFICANT CHANGE UP (ref 6–8.3)
PROT UR-MCNC: ABNORMAL
PROTHROM AB SERPL-ACNC: 18.6 SEC — HIGH (ref 9.8–13.1)
RBC # BLD: 4.77 M/UL — SIGNIFICANT CHANGE UP (ref 3.8–5.2)
RBC # FLD: 16.2 % — HIGH (ref 10.3–14.5)
RBC CASTS # UR COMP ASSIST: 10 /HPF — SIGNIFICANT CHANGE UP (ref 0–4)
SARS-COV-2 RNA SPEC QL NAA+PROBE: DETECTED
SODIUM SERPL-SCNC: 142 MMOL/L — SIGNIFICANT CHANGE UP (ref 135–145)
SP GR SPEC: 1.02 — SIGNIFICANT CHANGE UP (ref 1.01–1.02)
SPECIMEN SOURCE: SIGNIFICANT CHANGE UP
TROPONIN T, HIGH SENSITIVITY RESULT: 179 NG/L — CRITICAL HIGH
UROBILINOGEN FLD QL: SIGNIFICANT CHANGE UP
WBC # BLD: 8.61 K/UL — SIGNIFICANT CHANGE UP (ref 3.8–10.5)
WBC # FLD AUTO: 8.61 K/UL — SIGNIFICANT CHANGE UP (ref 3.8–10.5)
WBC UR QL: 2 /HPF — SIGNIFICANT CHANGE UP (ref 0–5)

## 2020-01-01 PROCEDURE — 99291 CRITICAL CARE FIRST HOUR: CPT

## 2020-01-01 PROCEDURE — 71250 CT THORAX DX C-: CPT | Mod: 26

## 2020-01-01 PROCEDURE — 99291 CRITICAL CARE FIRST HOUR: CPT | Mod: 25

## 2020-01-01 PROCEDURE — 71045 X-RAY EXAM CHEST 1 VIEW: CPT | Mod: 26

## 2020-01-01 PROCEDURE — 71045 X-RAY EXAM CHEST 1 VIEW: CPT | Mod: 26,77

## 2020-01-01 PROCEDURE — 70450 CT HEAD/BRAIN W/O DYE: CPT | Mod: 26

## 2020-01-01 RX ORDER — INFLUENZA VIRUS VACCINE 15; 15; 15; 15 UG/.5ML; UG/.5ML; UG/.5ML; UG/.5ML
0.5 SUSPENSION INTRAMUSCULAR ONCE
Refills: 0 | Status: DISCONTINUED | OUTPATIENT
Start: 2020-01-01 | End: 2020-01-01

## 2020-01-01 RX ORDER — CHLORHEXIDINE GLUCONATE 213 G/1000ML
15 SOLUTION TOPICAL EVERY 12 HOURS
Refills: 0 | Status: DISCONTINUED | OUTPATIENT
Start: 2020-01-01 | End: 2020-01-01

## 2020-01-01 RX ORDER — EPINEPHRINE 0.3 MG/.3ML
1 INJECTION INTRAMUSCULAR; SUBCUTANEOUS ONCE
Refills: 0 | Status: COMPLETED | OUTPATIENT
Start: 2020-01-01 | End: 2020-01-01

## 2020-01-01 RX ORDER — DEXTROSE 50 % IN WATER 50 %
50 SYRINGE (ML) INTRAVENOUS ONCE
Refills: 0 | Status: COMPLETED | OUTPATIENT
Start: 2020-01-01 | End: 2020-01-01

## 2020-01-01 RX ORDER — CHLORHEXIDINE GLUCONATE 213 G/1000ML
1 SOLUTION TOPICAL
Refills: 0 | Status: DISCONTINUED | OUTPATIENT
Start: 2020-01-01 | End: 2020-01-01

## 2020-01-01 RX ORDER — DEXAMETHASONE 0.5 MG/5ML
6 ELIXIR ORAL DAILY
Refills: 0 | Status: DISCONTINUED | OUTPATIENT
Start: 2020-01-01 | End: 2020-01-01

## 2020-01-01 RX ORDER — INSULIN HUMAN 100 [IU]/ML
5 INJECTION, SOLUTION SUBCUTANEOUS ONCE
Refills: 0 | Status: COMPLETED | OUTPATIENT
Start: 2020-01-01 | End: 2020-01-01

## 2020-01-01 RX ORDER — DEXTROSE 50 % IN WATER 50 %
12.5 SYRINGE (ML) INTRAVENOUS ONCE
Refills: 0 | Status: DISCONTINUED | OUTPATIENT
Start: 2020-01-01 | End: 2020-01-01

## 2020-01-01 RX ORDER — ACETAMINOPHEN 500 MG
650 TABLET ORAL EVERY 8 HOURS
Refills: 0 | Status: DISCONTINUED | OUTPATIENT
Start: 2020-01-01 | End: 2020-01-01

## 2020-01-01 RX ORDER — SODIUM CHLORIDE 9 MG/ML
1000 INJECTION, SOLUTION INTRAVENOUS
Refills: 0 | Status: DISCONTINUED | OUTPATIENT
Start: 2020-01-01 | End: 2020-01-01

## 2020-01-01 RX ORDER — PROPOFOL 10 MG/ML
10 INJECTION, EMULSION INTRAVENOUS
Qty: 1000 | Refills: 0 | Status: DISCONTINUED | OUTPATIENT
Start: 2020-01-01 | End: 2020-01-01

## 2020-01-01 RX ORDER — FENTANYL CITRATE 50 UG/ML
0.5 INJECTION INTRAVENOUS
Qty: 2500 | Refills: 0 | Status: DISCONTINUED | OUTPATIENT
Start: 2020-01-01 | End: 2020-01-01

## 2020-01-01 RX ORDER — SODIUM CHLORIDE 9 MG/ML
1000 INJECTION, SOLUTION INTRAVENOUS ONCE
Refills: 0 | Status: COMPLETED | OUTPATIENT
Start: 2020-01-01 | End: 2020-01-01

## 2020-01-01 RX ORDER — NOREPINEPHRINE BITARTRATE/D5W 8 MG/250ML
0.1 PLASTIC BAG, INJECTION (ML) INTRAVENOUS
Qty: 8 | Refills: 0 | Status: DISCONTINUED | OUTPATIENT
Start: 2020-01-01 | End: 2020-01-01

## 2020-01-01 RX ORDER — DEXTROSE 50 % IN WATER 50 %
15 SYRINGE (ML) INTRAVENOUS ONCE
Refills: 0 | Status: DISCONTINUED | OUTPATIENT
Start: 2020-01-01 | End: 2020-01-01

## 2020-01-01 RX ORDER — FENTANYL CITRATE 50 UG/ML
100 INJECTION INTRAVENOUS ONCE
Refills: 0 | Status: DISCONTINUED | OUTPATIENT
Start: 2020-01-01 | End: 2020-01-01

## 2020-01-01 RX ORDER — INSULIN LISPRO 100/ML
VIAL (ML) SUBCUTANEOUS EVERY 6 HOURS
Refills: 0 | Status: DISCONTINUED | OUTPATIENT
Start: 2020-01-01 | End: 2020-01-01

## 2020-01-01 RX ORDER — GLUCAGON INJECTION, SOLUTION 0.5 MG/.1ML
1 INJECTION, SOLUTION SUBCUTANEOUS ONCE
Refills: 0 | Status: DISCONTINUED | OUTPATIENT
Start: 2020-01-01 | End: 2020-01-01

## 2020-01-01 RX ORDER — DEXTROSE 50 % IN WATER 50 %
25 SYRINGE (ML) INTRAVENOUS ONCE
Refills: 0 | Status: DISCONTINUED | OUTPATIENT
Start: 2020-01-01 | End: 2020-01-01

## 2020-01-01 RX ORDER — CALCIUM CHLORIDE
1000 POWDER (GRAM) MISCELLANEOUS ONCE
Refills: 0 | Status: COMPLETED | OUTPATIENT
Start: 2020-01-01 | End: 2020-01-01

## 2020-01-01 RX ORDER — SODIUM BICARBONATE 1 MEQ/ML
50 SYRINGE (ML) INTRAVENOUS ONCE
Refills: 0 | Status: COMPLETED | OUTPATIENT
Start: 2020-01-01 | End: 2020-01-01

## 2020-01-01 RX ORDER — NOREPINEPHRINE BITARTRATE/D5W 8 MG/250ML
0.05 PLASTIC BAG, INJECTION (ML) INTRAVENOUS
Qty: 16 | Refills: 0 | Status: DISCONTINUED | OUTPATIENT
Start: 2020-01-01 | End: 2020-01-01

## 2020-01-01 RX ADMIN — Medication 1: at 18:27

## 2020-01-01 RX ADMIN — EPINEPHRINE 1 MILLIGRAM(S): 0.3 INJECTION INTRAMUSCULAR; SUBCUTANEOUS at 09:26

## 2020-01-01 RX ADMIN — FENTANYL CITRATE 100 MICROGRAM(S): 50 INJECTION INTRAVENOUS at 15:17

## 2020-01-01 RX ADMIN — Medication 1000 MILLIGRAM(S): at 09:35

## 2020-01-01 RX ADMIN — Medication 13.1 MICROGRAM(S)/KG/MIN: at 10:38

## 2020-01-01 RX ADMIN — CHLORHEXIDINE GLUCONATE 15 MILLILITER(S): 213 SOLUTION TOPICAL at 18:26

## 2020-01-01 RX ADMIN — CHLORHEXIDINE GLUCONATE 15 MILLILITER(S): 213 SOLUTION TOPICAL at 06:27

## 2020-01-01 RX ADMIN — Medication 6 MILLIGRAM(S): at 06:26

## 2020-01-01 RX ADMIN — CHLORHEXIDINE GLUCONATE 1 APPLICATION(S): 213 SOLUTION TOPICAL at 18:26

## 2020-01-01 RX ADMIN — FENTANYL CITRATE 100 MICROGRAM(S): 50 INJECTION INTRAVENOUS at 12:30

## 2020-01-01 RX ADMIN — EPINEPHRINE 1 MILLIGRAM(S): 0.3 INJECTION INTRAMUSCULAR; SUBCUTANEOUS at 09:30

## 2020-01-01 RX ADMIN — CHLORHEXIDINE GLUCONATE 1 APPLICATION(S): 213 SOLUTION TOPICAL at 06:27

## 2020-01-01 RX ADMIN — INSULIN HUMAN 5 UNIT(S): 100 INJECTION, SOLUTION SUBCUTANEOUS at 13:30

## 2020-01-01 RX ADMIN — Medication 13.1 MICROGRAM(S)/KG/MIN: at 19:28

## 2020-01-01 RX ADMIN — Medication 1: at 06:27

## 2020-01-01 RX ADMIN — Medication 50 MILLILITER(S): at 13:29

## 2020-01-01 RX ADMIN — SODIUM CHLORIDE 1000 MILLILITER(S): 9 INJECTION, SOLUTION INTRAVENOUS at 11:28

## 2020-01-01 RX ADMIN — Medication 50 MILLIEQUIVALENT(S): at 09:36

## 2020-01-01 RX ADMIN — EPINEPHRINE 1 MILLIGRAM(S): 0.3 INJECTION INTRAMUSCULAR; SUBCUTANEOUS at 09:33

## 2020-01-01 RX ADMIN — Medication 6 MILLIGRAM(S): at 16:39

## 2020-01-01 RX ADMIN — Medication 3.84 MICROGRAM(S)/KG/MIN: at 01:28

## 2020-12-05 NOTE — H&P ADULT - NSHPPHYSICALEXAM_GEN_ALL_CORE
PHYSICAL EXAM:    Vital Signs Last 24 Hrs  T(C): 36.5 (05 Dec 2020 11:09), Max: 36.5 (05 Dec 2020 11:09)  T(F): 97.7 (05 Dec 2020 11:09), Max: 97.7 (05 Dec 2020 11:09)  HR: 108 (05 Dec 2020 11:18) (56 - 150)  BP: 107/68 (05 Dec 2020 11:18) (64/35 - 145/60)  BP(mean): --  RR: 24 (05 Dec 2020 11:18) (16 - 28)  SpO2: 91% (05 Dec 2020 11:18) (88% - 93%)    General: No acute distress.  HEENT: NCAT.  PERRL.  EOMI.  No scleral icterus or injection.  Moist MM.  No oropharyngeal exudates.    Neck: Supple.  Full ROM.  No JVD.  No thyromegaly. No lymphadenopathy.   Heart: RRR.  Normal S1 and S2.  No murmurs, rubs, or gallops.   Lungs: coarse lung sounds b/l. Intubated, sedated.  Abdomen: BS+, soft, NT/ND.  No organomegaly.  Skin: Warm and dry.  No rashes.  Extremities: No edema, clubbing, or cyanosis.  2+ peripheral pulses b/l.  Musculoskeletal: No deformities.  No spinal or paraspinal tenderness.  Neuro: A&Ox0.  Intubated, sedated.

## 2020-12-05 NOTE — ED ADULT NURSE NOTE - OBJECTIVE STATEMENT
Pt received in Trauma room C as post cardiac arrest patient. Pt was found unresponsive at home about 2 hrs ago, pt's family started CPR on patient. EMS found pt to be in asystole. Pt was given epi and intubated in the field by EMS. ROSC achieved en route to hospital. On arrival to ER bed, pt lost pulse, CPR initiated. 3 dose of epi, 1 calcium, 1 bicarb, 1 shock initiated for Vfib. Labs and Covid sent off. 20G placed in by EMS on LAC. 20G in RAC.   Pt recently treated for UTI with abx. Pt currently on levophed drip, continuoulsy monitoring HR and BP. Pt received in Trauma room C as post cardiac arrest patient. Pt was found unresponsive at home about 2 hrs ago, pt's family started CPR on patient. EMS found pt to be in asystole. Pt was given epi 2x and intubated in the field by EMS. ROSC achieved en route to hospital. On arrival to ER bed, pt lost pulse, CPR initiated. 3 dose of epi, 1 calcium, 1 bicarb, 1 shock initiated for Vfib. Labs and COVID sent off. 20G placed in by EMS on LAC. 20G in RAC.   Pt recently treated for UTI with abx. Pt currently on levophed drip, continuously monitoring HR and BP. Pt received in Trauma room C as post cardiac arrest patient. Pt was found unresponsive at home about 2 hrs ago, pt's family started CPR on patient. EMS found pt to be in asystole. Pt was given epi 2x and intubated in the field by EMS. ROSC achieved en route to hospital. On arrival to ER bed, pt lost pulse, CPR initiated. 3 dose of epi, 1 calcium, 1 bicarb, 1 shock initiated for Vfib. Labs and COVID sent off. 20G placed in by EMS on LAC. 20G in RAC. Pt recently treated for UTI with abx. Pt currently on levophed drip, continuously monitoring HR and BP.

## 2020-12-05 NOTE — ED PROVIDER NOTE - ATTENDING CONTRIBUTION TO CARE
I performed a history and physical exam of the patient and discussed their management with the resident and /or advanced care provider. I reviewed the resident and /or ACP's note and agree with the documented findings and plan of care. My medical decision making and observations are found above.  Lungs rales, abd distended, +pulse.

## 2020-12-05 NOTE — H&P ADULT - ASSESSMENT
75F PMH including HTN, DM, ovarian cancer s/p surgery in 2007, DVT/PE on Xarelto presents to the ER by ambulance after being found unresponsive at home two hours ago; coded at home s/p chest compressions with ROSC en-route to ED; coded again in ED with heart block and ROSC achieved after 3 round CPR; CT chest c/f diffuse GGOs suspected COVID, CT head c/f hypoxic ischemic encephalopathy with cerebral edema.      Plan:     #Neuro  - AAOXO with likely hypoxic ischemic brain injury  - sedated on prop gtt and fent gtt    #Pulm  - GGOs c/f COVID  - F/U COVID PCR  - f/u BCs, f/u COVID labs (d-dimer, CRP, procalcitonin    #Cardiovascular    #GI    #Renal/    #ID    #MSK    #Endocrine    #PPX    #GOC     75F PMH including HTN, DM, ovarian cancer s/p surgery in 2007, DVT/PE on Xarelto presents to the ER by ambulance after being found unresponsive at home two hours ago; coded at home s/p chest compressions with ROSC en-route to ED; coded again in ED with heart block and ROSC achieved after 3 round CPR; CT chest c/f diffuse GGOs suspected COVID, CT head c/f hypoxic ischemic encephalopathy with cerebral edema.      Plan:     #Neuro  - AAOXO with likely hypoxic ischemic brain injury  - sedated on prop gtt and fent gtt    #Pulm  - GGOs c/f COVID  - F/U COVID PCR  - f/u BCs, f/u COVID labs (d-dimer, CRP, procalcitonin, LDH, ferritin)  - if COVID-19 PCR positive can consider dexamethasone; remdesivir contrx given renal and hepatic impairment    #Cardiovascular  - s/p 2 X codes with ROSC  - on levophed, family pursuing more comfort care approach at this time will cap pressors     #GI  - feeds: pending further family discussion  - shock liver: in setting of cardiac arrest     #Renal/  - renal failure with hyperkalemia, likely in setting of prerenal azotemia in setting of cardiac arrest  - more comfort care approach for family unlikely candidate for CRRT    #ID  - f/u COVID PCR  - if positive may be candidate for dex pending family wishes  - not a candidate for remdesivir given GI and  impairment  - f/u BCs, [facility]    #Endocrine  - establish family wishes regarding need for insulin/FS at this time currently pursuing more comfort care approach    #PPX  - h/o DVT/PE but will clarify with family if they would like further treatment    #GOC  - newly made DNR after 2nd code; will put in order and fill out MOLST     75F PMH including HTN, DM, ovarian cancer s/p surgery in 2007, DVT/PE on Xarelto presents to the ER by ambulance after being found unresponsive at home two hours ago; coded at home s/p chest compressions with ROSC en-route to ED; coded again in ED with heart block and ROSC achieved after 3 round CPR; CT chest c/f diffuse GGOs suspected COVID, CT head c/f hypoxic ischemic encephalopathy with cerebral edema.      Plan:     #Neuro  - AAOXO with likely hypoxic ischemic brain injury  - sedated on prop gtt and fent gtt    #Pulm  - GGOs c/f COVID  - F/U COVID PCR  - f/u BCs, f/u COVID labs (d-dimer, CRP, procalcitonin, LDH, ferritin)  - if COVID-19 PCR positive can consider dexamethasone; remdesivir contrx given renal and hepatic impairment    #Cardiovascular  - s/p 2 X codes with ROSC  - on levophed, family pursuing more comfort care approach at this time will cap pressors     #GI  - feeds: pending further family discussion  - shock liver: in setting of cardiac arrest     #Renal/  - renal failure with hyperkalemia, likely in setting of prerenal azotemia in setting of cardiac arrest  - more comfort care approach for family unlikely candidate for CRRT    #ID  - f/u COVID PCR  - if positive may be candidate for dex pending family wishes  - not a candidate for remdesivir given GI and  impairment  - f/u [facility], [facility]    #Endocrine  - establish family wishes regarding need for insulin/FS at this time currently pursuing more comfort care approach    #PPX  - h/o DVT/PE but will clarify with family if they would like further treatment    #GOC  - newly made DNR after 2nd code; will put in order and fill out MOLST  - avoiding invasive measures in alignment with family wishes

## 2020-12-05 NOTE — ED PROVIDER NOTE - PROGRESS NOTE DETAILS
Shortly after arriving to the ER, the patient lost pulses again. CPR was initiated immediately, epinephrine (x3) and calcium were given, defibrillation (x1) for Vfib, ROSC was achieved again. Repeat EKG shows no STEMI. Labs pending. Patient remains hypotensive despite pressors. Will continue to monitor closely. MICU aware of patient, coming to evaluate.

## 2020-12-05 NOTE — H&P ADULT - ATTENDING COMMENTS
75 F with history as above found unresponsive at home with cardiac arrest at home with ROSC, then with another cardiac arrest in ED here with ROSC.  Unclear etiology of cardiac arrest, could be due to ?covid or other process.  Now with acute hypoxemic and hypercapnic respiratory failure, likely due to aspiration pna.  R/o COVID.  CT head shows loss of grey white matter differentiation. Also has multi organ failure, STORMY, elevated lactic acid.    GOC discussion with family; plan to make her comfort care.  Pressors are capped, no further escalation of care.  If COVID negative, family may come in.    This patient is critically ill and required frequent bedside visits with therapy change. I have personally provided 35 minutes of critical care time concurrently with the resident/fellow. This time excludes time spent on separate procedures and time spent teaching. I have reviewed the resident/fellow’s documentation and I agree with the assessment and plan of care.

## 2020-12-05 NOTE — ED ADULT NURSE NOTE - INTERVENTIONS DEFINITIONS
Call bell, personal items and telephone within reach/Room bathroom lighting operational/Non-slip footwear when patient is off stretcher/Physically safe environment: no spills, clutter or unnecessary equipment/Monitor for mental status changes and reorient to person, place, and time/[place] to call system/Instruct patient to call for assistance/Stretcher in lowest position, wheels locked, appropriate side rails in place/Monitor gait and stability/Review medications for side effects contributing to fall risk

## 2020-12-05 NOTE — ED PROVIDER NOTE - OBJECTIVE STATEMENT
75F with PMH including HTN, DM, ovarian cancer s/p surgery in 2007, PE on Xarelto presents to the ER by ambulance after being found unresponsive at home two hours ago. Family started CPR and called EMS. Initial rhythm in the field was reportedly asystole. Intubated by EMS. ROSC achieved in route to the hospital. Of note, pt was recently discharged after an admission for UTI (on abx the past 3 days).

## 2020-12-05 NOTE — H&P ADULT - NSHPLABSRESULTS_GEN_ALL_CORE
CBC Full  -  ( 05 Dec 2020 10:13 )  WBC Count : 8.61 K/uL  Hemoglobin : 13.4 g/dL  Hematocrit : 48.4 %  Platelet Count - Automated : 119 K/uL  Mean Cell Volume : 101.5 fL  Mean Cell Hemoglobin : 28.1 pg  Mean Cell Hemoglobin Concentration : 27.7 gm/dL  Auto Neutrophil # : x  Auto Lymphocyte # : x  Auto Monocyte # : x  Auto Eosinophil # : x  Auto Basophil # : x  Auto Neutrophil % : x  Auto Lymphocyte % : x  Auto Monocyte % : x  Auto Eosinophil % : x  Auto Basophil % : x        142  |  99  |  36<H>  ----------------------------<  172<H>  5.4<H>   |  11<L>  |  2.17<H>    Ca    8.7      05 Dec 2020 10:13    TPro  6.8  /  Alb  3.0<L>  /  TBili  0.8  /  DBili  x   /  AST  811<H>  /  ALT  464<H>  /  AlkPhos  66  12-05    LIVER FUNCTIONS - ( 05 Dec 2020 10:13 )  Alb: 3.0 g/dL / Pro: 6.8 g/dL / ALK PHOS: 66 U/L / ALT: 464 U/L / AST: 811 U/L / GGT: x           Urinalysis Basic - ( 05 Dec 2020 11:29 )    Color: Yellow / Appearance: Clear / S.023 / pH: x  Gluc: x / Ketone: Negative  / Bili: Negative / Urobili: <2 mg/dL   Blood: x / Protein: 100 mg/dL / Nitrite: Negative   Leuk Esterase: Negative / RBC: 10 /HPF / WBC 2 /HPF   Sq Epi: x / Non Sq Epi: 6 /HPF / Bacteria: Few      PT/INR - ( 05 Dec 2020 10:13 )   PT: 18.6 sec;   INR: 1.67 ratio         PTT - ( 05 Dec 2020 10:13 )  PTT:34.7 sec  42    < from: CT Chest No Cont (20 @ 11:59) >    FINDINGS:    AIRWAYS, LUNGS and PLEURA: Endotracheal tube tip 2 cm above the lou Patent central airways. Diffuse bilateral consolidative and groundglass opacities within all lobes. Bibasilar atelectasis.    No pleural effusion. No pneumothorax.    MEDIASTINUM AND NANCY: No lymphadenopathy.    HEART AND VESSELS: Cardiomegaly. No pericardial effusion. Thoracic aorta normal in diameter. Dilated main pulmonary artery measuring 2.7 cm. Pulmonary embolus cannot be evaluated secondary to lack of contrast.    VISUALIZED UPPER ABDOMEN: Within normal limits.    CHEST WALL AND BONES: No aggressive osseous lesion. Fracture of right anterior 4th-6th ribs. Fracture of left anterior cerebral-6 ribs. Calcified left subpectoral lymph nodes are unchanged.    LOWER NECK: Within normal limits.    IMPRESSION:    Diffuse bilateral groundglass and consolidative opacities.    Fracture of anterior bilateral ribs. No pleural effusion or pneumothorax.    < end of copied text >    < from: CT Head No Cont (20 @ 11:59) >    FINDINGS:  VENTRICLES AND SULCI:  There is diffuse sulcal effacement involving the cerebral and cerebellar hemispheres with associated ventricular effacement.  INTRA-AXIAL:  There is diffuse loss of the gray-white matter differentiation compatible with ischemic changes. Areas of white matter hypodensity are seen in a periventricular distribution which may be microvascular in nature. No intracranial hemorrhage or midline shift isseen.  EXTRA-AXIAL:  No mass or collection is seen. There is mild effacement of the basal cisterns.  VISUALIZED SINUSES:  Mucosal thickening with maxillary and sphenoid air-fluid levels which may be secondary to the presence of an ET tube.  VISUALIZED MASTOIDS:  Clear.  CALVARIUM:  Normal.  MISCELLANEOUS:  None.    IMPRESSION:  Diffuse cerebral edema with loss of gray-white matter differentiation compatible with hypoxic ischemic encephalopathy.    < end of copied text >

## 2020-12-05 NOTE — CHART NOTE - NSCHARTNOTEFT_GEN_A_CORE
GOC Conversation was held between Pt's daughter and two sons (Yanni, Dann, and Vincent). Family was informed about CT imagining showing severe anoxic brain injury. Discussion was held about overall prognosis. Pt's family wished not to prolong suffering with any more invasive measures. Per family, overall management should be geared toward comfort. Per, Pt family, Pt is DNR, comfort measures.     Brennan Rose MD PGY3

## 2020-12-05 NOTE — ED ADULT TRIAGE NOTE - CHIEF COMPLAINT QUOTE
arrives notification/ems from home post cardiac arrest. reported found unresponsive 2 hrs ago,family initiated cpr. pt arrives intubated by ems 7.5 et tube,20 angio iv in place. received epi x 2 by ems with rosc. fs by ems 232. pmh dm,htn with recent uti.

## 2020-12-05 NOTE — H&P ADULT - HISTORY OF PRESENT ILLNESS
75F PMH including HTN, DM, ovarian cancer s/p surgery in 2007, DVT/PE on Xarelto presents to the ER by ambulance after being found unresponsive at home two hours ago. Family started CPR and called EMS. Initial rhythm in the field was reportedly asystole. Intubated by EMS. ROSC achieved in route to the hospital. Of note, pt was recently discharged after an admission for UTI (on abx the past 3 days).     In the ED: Patient bradycardic, EKG appeared possible 3rd degree heart block; patient coded and chest compressions were performed, given Epi x3, calcium 1G, bicarb 50 and defibrillation was performed and ROSC achieved. PAtient intubated. Patient transferred to ICU for further care.

## 2020-12-05 NOTE — ED PROVIDER NOTE - CLINICAL SUMMARY MEDICAL DECISION MAKING FREE TEXT BOX
Cardiac arrest in pt with hx including HTN, DM, PE (on AC). Lost pulses again on arrival to ER. ROSC achieved s/p epi/calcium/defibrillation. Intubation confirmed with CXR. No STEMI on EKG. CXR concerning (on my read) for ARDS. Will c/w levophed drip, protective ventilation strategy, follow up labs, then determine need for further evaluation. Cardiac arrest in pt with hx including HTN, DM, PE (on AC). Lost pulses again on arrival to ER. ROSC achieved s/p epi/calcium/defibrillation. Intubation confirmed with CXR. No STEMI on EKG. CXR concerning (on my read) for ARDS. Will c/w levophed drip, protective ventilation strategy, follow up labs, then determine need for further evaluation.  Irene: arrives after asystolic cardiac arrest. pulse 50 on arrival. lost pulses, got full ACLS +bicarb and calcium and had ROSC. family notified of pt condition. Patient now post arrest. CXR with interstitial marking CHF, ARDS or covid. Patient with htn diabetes and PE in hx.

## 2020-12-05 NOTE — PATIENT PROFILE ADULT - FALL HARM RISK CONCLUSION
PAST MEDICAL HISTORY:  BPH (benign prostatic hyperplasia)     Glaucoma     Hyperlipidemia     Hypertension     Pre-diabetes Universal Safety Interventions

## 2020-12-06 NOTE — PROGRESS NOTE ADULT - ATTENDING COMMENTS
75 F with history as above found unresponsive at home with cardiac arrest at home with ROSC, then with another cardiac arrest in ED here with ROSC.  Unclear etiology of cardiac arrest, could be due to ?covid or other process.  Now with acute hypoxemic and hypercapnic respiratory failure, likely due to aspiration pna and COVID.  has multi organ failure, STORMY, elevated lactic acid.  Family opting for comfort care - will electively extubate today with family on facetime.

## 2020-12-06 NOTE — DISCHARGE NOTE FOR THE EXPIRED PATIENT - HOSPITAL COURSE
75F PMH including HTN, DM, ovarian cancer s/p surgery in 2007, DVT/PE on Xarelto presents to the ER by ambulance after being found unresponsive at home two hours ago; coded at home s/p chest compressions with ROSC en-route to ED; coded again in ED with heart block and ROSC achieved after 3 round CPR; CT chest c/f diffuse GGOs found to be COVID+ , CT head c/f hypoxic ischemic encephalopathy with cerebral edema. Patient transitioned to palliative care per family wishes. The patient went into cardiopulmonary arrest.

## 2020-12-06 NOTE — PROGRESS NOTE ADULT - ASSESSMENT
75F PMH including HTN, DM, ovarian cancer s/p surgery in 2007, DVT/PE on Xarelto presents to the ER by ambulance after being found unresponsive at home two hours ago; coded at home s/p chest compressions with ROSC en-route to ED; coded again in ED with heart block and ROSC achieved after 3 round CPR; CT chest c/f diffuse GGOs suspected COVID, CT head c/f hypoxic ischemic encephalopathy with cerebral edema.      Plan:     #Neuro  - AAOXO with likely hypoxic ischemic brain injury  - sedated on prop gtt and fent gtt    #Pulm  - GGOs c/f COVID  - F/U COVID PCR  - f/u BCs, f/u COVID labs (d-dimer, CRP, procalcitonin, LDH, ferritin)  - if COVID-19 PCR positive can consider dexamethasone; remdesivir contrx given renal and hepatic impairment    #Cardiovascular  - s/p 2 X codes with ROSC  - on levophed, family pursuing more comfort care approach at this time will cap pressors     #GI  - feeds: pending further family discussion  - shock liver: in setting of cardiac arrest     #Renal/  - renal failure with hyperkalemia, likely in setting of prerenal azotemia in setting of cardiac arrest  - more comfort care approach for family unlikely candidate for CRRT    #ID  - f/u COVID PCR  - if positive may be candidate for dex pending family wishes  - not a candidate for remdesivir given GI and  impairment  - f/u [facility], [facility]    #Endocrine  - establish family wishes regarding need for insulin/FS at this time currently pursuing more comfort care approach    #PPX  - h/o DVT/PE but will clarify with family if they would like further treatment    #GOC  - newly made DNR after 2nd code; will put in order and fill out MOLST  - avoiding invasive measures in alignment with family wishes     75F PMH including HTN, DM, ovarian cancer s/p surgery in 2007, DVT/PE on Xarelto presents to the ER by ambulance after being found unresponsive at home two hours ago; coded at home s/p chest compressions with ROSC en-route to ED; coded again in ED with heart block and ROSC achieved after 3 round CPR; CT chest c/f diffuse GGOs suspected COVID, CT head c/f hypoxic ischemic encephalopathy with cerebral edema. Patient went into asystole and passed away in the afternoon.       Plan:     #Neuro  - AAOXO with likely hypoxic ischemic brain injury  - sedated on prop gtt and fent gtt  - Patient went in to asystole in the afternoon and passed away     #Pulm  - COVID +     #Cardiovascular  - s/p 2 X codes with ROSC  - on levophed, family pursuing more comfort care approach at this time will cap pressors     #GI  - shock liver: in setting of cardiac arrest     #Renal/  - renal failure with hyperkalemia, likely in setting of prerenal azotemia in setting of cardiac arrest  - Comfort care per Family, Annemarie in chart     #ID  - COVID +     #Endocrine  - Comfort care     #PPX  - h/o DVT/PE transitioned to comfort care     #GOC  - newly made DNR after 2nd code; MOLST in chart     Nikita Chaudhary MD   Internal Medicine PGY1

## 2020-12-06 NOTE — CHART NOTE - NSCHARTNOTEFT_GEN_A_CORE
Spoke with both Son Vincent and Daughter Yanni; they agree to palliative extubation later this afternoon via facetime. They need to get in touch with Dann the other son who lives in [place]. Will call Yanni back at 11AM to check back in and plan for palliative extubation this afternoon.

## 2020-12-06 NOTE — PROGRESS NOTE ADULT - SUBJECTIVE AND OBJECTIVE BOX
INTERVAL HPI/OVERNIGHT EVENTS:    SUBJECTIVE: Patient seen and examined at bedside.       VITAL SIGNS:  ICU Vital Signs Last 24 Hrs  T(C): 36.4 (06 Dec 2020 04:00), Max: 36.5 (05 Dec 2020 11:09)  T(F): 97.5 (06 Dec 2020 04:00), Max: 97.7 (05 Dec 2020 11:09)  HR: 101 (06 Dec 2020 07:12) (56 - 150)  BP: 105/64 (06 Dec 2020 07:00) (64/35 - 159/62)  BP(mean): 76 (06 Dec 2020 07:00) (58 - 93)  ABP: --  ABP(mean): --  RR: 24 (06 Dec 2020 07:00) (16 - 28)  SpO2: 89% (06 Dec 2020 07:12) (87% - 94%)    Mode: AC/ CMV (Assist Control/ Continuous Mandatory Ventilation), RR (machine): 24, TV (machine): 450, FiO2: 100, PEEP: 10, ITime: 0.86, MAP: 16, PIP: 32  Plateau pressure:   P/F ratio:     12-05 @ 07:01  -  12-06 @ 07:00  --------------------------------------------------------  IN: 1410.2 mL / OUT: 550 mL / NET: 860.2 mL      CAPILLARY BLOOD GLUCOSE      POCT Blood Glucose.: 158 mg/dL (06 Dec 2020 06:26)    ECG:    PHYSICAL EXAM:    General:   HEENT:   Neck:   Respiratory:   Cardiovascular:   Abdomen:   Extremities:  Neurological:    MEDICATIONS:  MEDICATIONS  (STANDING):  chlorhexidine 0.12% Liquid 15 milliLiter(s) Oral Mucosa every 12 hours  chlorhexidine 4% Liquid 1 Application(s) Topical <User Schedule>  dexAMETHasone  Injectable 6 milliGRAM(s) IV Push daily  dextrose 40% Gel 15 Gram(s) Oral once  dextrose 5%. 1000 milliLiter(s) (50 mL/Hr) IV Continuous <Continuous>  dextrose 5%. 1000 milliLiter(s) (100 mL/Hr) IV Continuous <Continuous>  dextrose 50% Injectable 25 Gram(s) IV Push once  dextrose 50% Injectable 12.5 Gram(s) IV Push once  dextrose 50% Injectable 25 Gram(s) IV Push once  glucagon  Injectable 1 milliGRAM(s) IntraMuscular once  insulin lispro (ADMELOG) corrective regimen sliding scale   SubCutaneous every 6 hours  norepinephrine Infusion 0.05 MICROgram(s)/kG/Min (3.84 mL/Hr) IV Continuous <Continuous>    MEDICATIONS  (PRN):  acetaminophen   Tablet .. 650 milliGRAM(s) Oral every 8 hours PRN Temp greater or equal to 38C (100.4F), Mild Pain (1 - 3), Moderate Pain (4 - 6)      ALLERGIES:  Allergies    No Known Allergies    Intolerances        LABS:                        13.4   8.61  )-----------( 119      ( 05 Dec 2020 10:13 )             48.4     12-    142  |  99  |  36<H>  ----------------------------<  172<H>  5.4<H>   |  11<L>  |  2.17<H>    Ca    8.7      05 Dec 2020 10:13    TPro  6.8  /  Alb  3.0<L>  /  TBili  0.8  /  DBili  x   /  AST  811<H>  /  ALT  464<H>  /  AlkPhos  66  12-05    PT/INR - ( 05 Dec 2020 10:13 )   PT: 18.6 sec;   INR: 1.67 ratio         PTT - ( 05 Dec 2020 10:13 )  PTT:34.7 sec  Urinalysis Basic - ( 05 Dec 2020 11:29 )    Color: Yellow / Appearance: Clear / S.023 / pH: x  Gluc: x / Ketone: Negative  / Bili: Negative / Urobili: <2 mg/dL   Blood: x / Protein: 100 mg/dL / Nitrite: Negative   Leuk Esterase: Negative / RBC: 10 /HPF / WBC 2 /HPF   Sq Epi: x / Non Sq Epi: 6 /HPF / Bacteria: Few        RADIOLOGY & ADDITIONAL TESTS: Reviewed.   INTERVAL HPI/OVERNIGHT EVENTS:    SUBJECTIVE: Patient seen and examined at bedside.     REVIEW OF SYSTEMS: Unable to obtain, intubated sedated     VITAL SIGNS:  ICU Vital Signs Last 24 Hrs  T(C): 36.4 (06 Dec 2020 04:00), Max: 36.5 (05 Dec 2020 11:09)  T(F): 97.5 (06 Dec 2020 04:00), Max: 97.7 (05 Dec 2020 11:09)  HR: 101 (06 Dec 2020 07:12) (56 - 150)  BP: 105/64 (06 Dec 2020 07:00) (64/35 - 159/62)  BP(mean): 76 (06 Dec 2020 07:00) (58 - 93)  ABP: --  ABP(mean): --  RR: 24 (06 Dec 2020 07:00) (16 - 28)  SpO2: 89% (06 Dec 2020 07:12) (87% - 94%)    Mode: AC/ CMV (Assist Control/ Continuous Mandatory Ventilation), RR (machine): 24, TV (machine): 450, FiO2: 100, PEEP: 10, ITime: 0.86, MAP: 16, PIP: 32  Plateau pressure:   P/F ratio:     12-05 @ 07:01  -  12-06 @ 07:00  --------------------------------------------------------  IN: 1410.2 mL / OUT: 550 mL / NET: 860.2 mL      CAPILLARY BLOOD GLUCOSE      POCT Blood Glucose.: 158 mg/dL (06 Dec 2020 06:26)    ECG:    PHYSICAL EXAM:    General:   HEENT:   Neck:   Respiratory:   Cardiovascular:   Abdomen:   Extremities:  Neurological:    MEDICATIONS:  MEDICATIONS  (STANDING):  chlorhexidine 0.12% Liquid 15 milliLiter(s) Oral Mucosa every 12 hours  chlorhexidine 4% Liquid 1 Application(s) Topical <User Schedule>  dexAMETHasone  Injectable 6 milliGRAM(s) IV Push daily  dextrose 40% Gel 15 Gram(s) Oral once  dextrose 5%. 1000 milliLiter(s) (50 mL/Hr) IV Continuous <Continuous>  dextrose 5%. 1000 milliLiter(s) (100 mL/Hr) IV Continuous <Continuous>  dextrose 50% Injectable 25 Gram(s) IV Push once  dextrose 50% Injectable 12.5 Gram(s) IV Push once  dextrose 50% Injectable 25 Gram(s) IV Push once  glucagon  Injectable 1 milliGRAM(s) IntraMuscular once  insulin lispro (ADMELOG) corrective regimen sliding scale   SubCutaneous every 6 hours  norepinephrine Infusion 0.05 MICROgram(s)/kG/Min (3.84 mL/Hr) IV Continuous <Continuous>    MEDICATIONS  (PRN):  acetaminophen   Tablet .. 650 milliGRAM(s) Oral every 8 hours PRN Temp greater or equal to 38C (100.4F), Mild Pain (1 - 3), Moderate Pain (4 - 6)      ALLERGIES:  Allergies    No Known Allergies    Intolerances        LABS:                        13.4   8.61  )-----------( 119      ( 05 Dec 2020 10:13 )             48.4     12-    142  |  99  |  36<H>  ----------------------------<  172<H>  5.4<H>   |  11<L>  |  2.17<H>    Ca    8.7      05 Dec 2020 10:13    TPro  6.8  /  Alb  3.0<L>  /  TBili  0.8  /  DBili  x   /  AST  811<H>  /  ALT  464<H>  /  AlkPhos  66  12-05    PT/INR - ( 05 Dec 2020 10:13 )   PT: 18.6 sec;   INR: 1.67 ratio         PTT - ( 05 Dec 2020 10:13 )  PTT:34.7 sec  Urinalysis Basic - ( 05 Dec 2020 11:29 )    Color: Yellow / Appearance: Clear / S.023 / pH: x  Gluc: x / Ketone: Negative  / Bili: Negative / Urobili: <2 mg/dL   Blood: x / Protein: 100 mg/dL / Nitrite: Negative   Leuk Esterase: Negative / RBC: 10 /HPF / WBC 2 /HPF   Sq Epi: x / Non Sq Epi: 6 /HPF / Bacteria: Few        RADIOLOGY & ADDITIONAL TESTS: Reviewed.

## 2020-12-06 NOTE — CHART NOTE - NSCHARTNOTEFT_GEN_A_CORE
DEATH NOTE    Called to bedside to evaluate the patient for bradycardia and then asystole.     On physical exam, patient did not respond to verbal or noxious stimuli.  No spontaneous respirations.  Absent heart and breath sounds.  Absent radial and carotid pulses.   Pupils are fixed and dilated, no corneal reflex.  EKG rhythm strip shows asystole. Patient pronounced dead at 14:28.  Attending notified.  Family was not offered an autopsy due to a clear cause of death.       Nikita Chaudhary MD   Internal Medicine PGY1 DEATH NOTE    Called to bedside to evaluate the patient for bradycardia and then asystole.     On physical exam, patient did not respond to verbal or noxious stimuli.  No spontaneous respirations.  Absent heart and breath sounds.  Absent radial and carotid pulses.   Pupils are fixed and dilated, no corneal reflex.  EKG rhythm strip shows asystole. Patient pronounced dead at 15:28.  Attending notified.  Family was not offered an autopsy due to a clear cause of death.       Nikita Chaudhary MD   Internal Medicine PGY1

## 2020-12-06 NOTE — DISCHARGE NOTE FOR THE EXPIRED PATIENT - NAME OF PERSON WHO MADE CONTACTED FAMILY
Dann Liao and Mark. All 3 children were allowed to say good bye through video application on electronic device

## 2020-12-07 LAB
B PERT DNA SPEC QL NAA+PROBE: SIGNIFICANT CHANGE UP
C PNEUM DNA SPEC QL NAA+PROBE: SIGNIFICANT CHANGE UP
FLUAV H1 2009 PAND RNA SPEC QL NAA+PROBE: SIGNIFICANT CHANGE UP
FLUAV H1 RNA SPEC QL NAA+PROBE: SIGNIFICANT CHANGE UP
FLUAV H3 RNA SPEC QL NAA+PROBE: SIGNIFICANT CHANGE UP
FLUAV SUBTYP SPEC NAA+PROBE: SIGNIFICANT CHANGE UP
FLUBV RNA SPEC QL NAA+PROBE: SIGNIFICANT CHANGE UP
HADV DNA SPEC QL NAA+PROBE: SIGNIFICANT CHANGE UP
HCOV PNL SPEC NAA+PROBE: SIGNIFICANT CHANGE UP
HMPV RNA SPEC QL NAA+PROBE: SIGNIFICANT CHANGE UP
HPIV1 RNA SPEC QL NAA+PROBE: SIGNIFICANT CHANGE UP
HPIV2 RNA SPEC QL NAA+PROBE: SIGNIFICANT CHANGE UP
HPIV3 RNA SPEC QL NAA+PROBE: SIGNIFICANT CHANGE UP
HPIV4 RNA SPEC QL NAA+PROBE: SIGNIFICANT CHANGE UP
RAPID RVP RESULT: DETECTED
RV+EV RNA SPEC QL NAA+PROBE: SIGNIFICANT CHANGE UP
SARS-COV-2 RNA SPEC QL NAA+PROBE: DETECTED

## 2020-12-10 LAB
CULTURE RESULTS: SIGNIFICANT CHANGE UP
CULTURE RESULTS: SIGNIFICANT CHANGE UP
SPECIMEN SOURCE: SIGNIFICANT CHANGE UP
SPECIMEN SOURCE: SIGNIFICANT CHANGE UP

## 2023-07-25 NOTE — PHYSICAL THERAPY INITIAL EVALUATION ADULT - CRITERIA FOR SKILLED THERAPEUTIC INTERVENTIONS
functional limitations in following categories/risk reduction/prevention/impairments found
Detail Level: Simple
Abridged Text (If No Specifics Are Selected): Educational resources were provided and detailed information can be found on the patient education handout.
Render Patient Education In Note?: render abridged patient education

## 2023-11-28 NOTE — ED ADULT TRIAGE NOTE - MODE OF ARRIVAL

## 2025-02-18 NOTE — ED ADULT NURSE REASSESSMENT NOTE - NS ED NURSE REASSESS COMMENT FT1
Pt picked up by [facility]. Pt remains on BiPAP, tolerating well, sating 99%. Pt remains on heparin drip at 12mL/hr. Sinus rhythm on monitor. respirations are equal and nonlabored. 20 gauge to the right AC, 20 gauge to the left hand. Pt stable and in no apparent distress at this time. Family at bedside. paperwork provided to transfer center. vitals as noted. Pt denies any complaints at this time
Received report from ignacio Vance. pt A&OX3, family at bedside. Pt on BiPAP settings 10/5 60% O2. pt tolerating well at this time, sating 98%. respirations are equal and nonlabored, no respiratory distress noted. Sinus rhythm on monitor. Vitals as noted. pt denies any complaints at this time. Pt on heparin drip at 15mL/hr. Pt tolerating well. Will continue to reassess and monitor
until cleared